# Patient Record
Sex: MALE | Race: WHITE | NOT HISPANIC OR LATINO | Employment: FULL TIME | URBAN - METROPOLITAN AREA
[De-identification: names, ages, dates, MRNs, and addresses within clinical notes are randomized per-mention and may not be internally consistent; named-entity substitution may affect disease eponyms.]

---

## 2017-04-06 ENCOUNTER — HISTORICAL (OUTPATIENT)
Dept: RADIOLOGY | Facility: HOSPITAL | Age: 57
End: 2017-04-06

## 2018-01-30 ENCOUNTER — HISTORICAL (OUTPATIENT)
Dept: ADMINISTRATIVE | Facility: HOSPITAL | Age: 58
End: 2018-01-30

## 2018-01-30 LAB
ABS NEUT (OLG): 3.31 X10(3)/MCL (ref 2.1–9.2)
ALBUMIN SERPL-MCNC: 3.9 GM/DL (ref 3.4–5)
ALBUMIN/GLOB SERPL: 1 {RATIO}
ALP SERPL-CCNC: 99 UNIT/L (ref 50–136)
ALT SERPL-CCNC: 29 UNIT/L (ref 12–78)
AST SERPL-CCNC: 16 UNIT/L (ref 15–37)
BASOPHILS # BLD AUTO: 0.1 X10(3)/MCL (ref 0–0.2)
BASOPHILS NFR BLD AUTO: 1 %
BILIRUB SERPL-MCNC: 0.9 MG/DL (ref 0.2–1)
BILIRUBIN DIRECT+TOT PNL SERPL-MCNC: 0.2 MG/DL (ref 0–0.2)
BILIRUBIN DIRECT+TOT PNL SERPL-MCNC: 0.7 MG/DL (ref 0–0.8)
BUN SERPL-MCNC: 12 MG/DL (ref 7–18)
CALCIUM SERPL-MCNC: 9 MG/DL (ref 8.5–10.1)
CHLORIDE SERPL-SCNC: 104 MMOL/L (ref 98–107)
CHOLEST SERPL-MCNC: 282 MG/DL (ref 0–200)
CHOLEST/HDLC SERPL: 5.1 {RATIO} (ref 0–5)
CO2 SERPL-SCNC: 29 MMOL/L (ref 21–32)
CREAT SERPL-MCNC: 1.01 MG/DL (ref 0.7–1.3)
EOSINOPHIL # BLD AUTO: 0.2 X10(3)/MCL (ref 0–0.9)
EOSINOPHIL NFR BLD AUTO: 3 %
ERYTHROCYTE [DISTWIDTH] IN BLOOD BY AUTOMATED COUNT: 12.2 % (ref 11.5–17)
GLOBULIN SER-MCNC: 3.9 GM/DL (ref 2.4–3.5)
GLUCOSE SERPL-MCNC: 92 MG/DL (ref 74–106)
HCT VFR BLD AUTO: 47.9 % (ref 42–52)
HDLC SERPL-MCNC: 55 MG/DL (ref 35–60)
HGB BLD-MCNC: 16.7 GM/DL (ref 14–18)
LDLC SERPL CALC-MCNC: 188 MG/DL (ref 0–129)
LYMPHOCYTES # BLD AUTO: 2.9 X10(3)/MCL (ref 0.6–4.6)
LYMPHOCYTES NFR BLD AUTO: 40 %
MCH RBC QN AUTO: 32.2 PG (ref 27–31)
MCHC RBC AUTO-ENTMCNC: 34.9 GM/DL (ref 33–36)
MCV RBC AUTO: 92.5 FL (ref 80–94)
MONOCYTES # BLD AUTO: 0.7 X10(3)/MCL (ref 0.1–1.3)
MONOCYTES NFR BLD AUTO: 10 %
NEUTROPHILS # BLD AUTO: 3.31 X10(3)/MCL (ref 1.4–7.9)
NEUTROPHILS NFR BLD AUTO: 46 %
PLATELET # BLD AUTO: 282 X10(3)/MCL (ref 130–400)
PMV BLD AUTO: 9.4 FL (ref 9.4–12.4)
POTASSIUM SERPL-SCNC: 4.4 MMOL/L (ref 3.5–5.1)
PROT SERPL-MCNC: 7.8 GM/DL (ref 6.4–8.2)
PSA SERPL-MCNC: 1.01 NG/ML (ref 0–4)
RBC # BLD AUTO: 5.18 X10(6)/MCL (ref 4.7–6.1)
SODIUM SERPL-SCNC: 138 MMOL/L (ref 136–145)
TRIGL SERPL-MCNC: 193 MG/DL (ref 30–150)
TSH SERPL-ACNC: 2.93 MIU/ML (ref 0.36–3.74)
VLDLC SERPL CALC-MCNC: 39 MG/DL
WBC # SPEC AUTO: 7.2 X10(3)/MCL (ref 4.5–11.5)

## 2021-02-08 LAB
BUN SERPL-MCNC: 12 MG/DL (ref 7–18)
CALCIUM SERPL-MCNC: 9.8 MG/DL (ref 8.5–10.1)
CHLORIDE SERPL-SCNC: 104 MMOL/L (ref 98–107)
CHOLEST SERPL-MCNC: 229 MG/DL
CO2 SERPL-SCNC: 28 MMOL/L (ref 21–32)
CREAT SERPL-MCNC: 1.08 MG/DL (ref 0.6–1.3)
GLUCOSE SERPL-MCNC: 98 MG/DL (ref 74–106)
HDLC SERPL-MCNC: 52 MG/DL (ref 35–60)
LDLC SERPL CALC-MCNC: 150 MG/DL
POTASSIUM SERPL-SCNC: 4.5 MMOL/L (ref 3.5–5.1)
SODIUM SERPL-SCNC: 141 MEQ/L (ref 131–145)
TRIGL SERPL-MCNC: 147 MG/DL (ref 30–150)

## 2021-11-09 LAB — CRC RECOMMENDATION EXT: NORMAL

## 2022-04-09 ENCOUNTER — HISTORICAL (OUTPATIENT)
Dept: ADMINISTRATIVE | Facility: HOSPITAL | Age: 62
End: 2022-04-09
Payer: COMMERCIAL

## 2022-04-27 VITALS
HEIGHT: 67 IN | OXYGEN SATURATION: 96 % | SYSTOLIC BLOOD PRESSURE: 160 MMHG | WEIGHT: 225 LBS | DIASTOLIC BLOOD PRESSURE: 94 MMHG | BODY MASS INDEX: 35.31 KG/M2

## 2022-09-16 ENCOUNTER — HISTORICAL (OUTPATIENT)
Dept: ADMINISTRATIVE | Facility: HOSPITAL | Age: 62
End: 2022-09-16
Payer: COMMERCIAL

## 2022-11-10 ENCOUNTER — DOCUMENTATION ONLY (OUTPATIENT)
Dept: INTERNAL MEDICINE | Facility: CLINIC | Age: 62
End: 2022-11-10
Payer: COMMERCIAL

## 2023-01-26 ENCOUNTER — DOCUMENTATION ONLY (OUTPATIENT)
Dept: INTERNAL MEDICINE | Facility: CLINIC | Age: 63
End: 2023-01-26
Payer: COMMERCIAL

## 2023-01-27 ENCOUNTER — DOCUMENTATION ONLY (OUTPATIENT)
Dept: INTERNAL MEDICINE | Facility: CLINIC | Age: 63
End: 2023-01-27
Payer: COMMERCIAL

## 2023-02-14 DIAGNOSIS — Z13.1 SCREENING FOR DIABETES MELLITUS (DM): ICD-10-CM

## 2023-02-14 DIAGNOSIS — Z00.00 WELLNESS EXAMINATION: Primary | ICD-10-CM

## 2023-02-14 DIAGNOSIS — E78.00 HYPERCHOLESTEREMIA: ICD-10-CM

## 2023-02-14 DIAGNOSIS — Z12.5 SCREENING PSA (PROSTATE SPECIFIC ANTIGEN): ICD-10-CM

## 2023-02-14 DIAGNOSIS — R73.01 IFG (IMPAIRED FASTING GLUCOSE): ICD-10-CM

## 2023-02-14 DIAGNOSIS — R53.83 FATIGUE, UNSPECIFIED TYPE: ICD-10-CM

## 2023-02-20 ENCOUNTER — TELEPHONE (OUTPATIENT)
Dept: INTERNAL MEDICINE | Facility: CLINIC | Age: 63
End: 2023-02-20
Payer: COMMERCIAL

## 2023-02-20 NOTE — TELEPHONE ENCOUNTER
----- Message from Vic Dobbs LPN sent at 2/14/2023 11:29 AM CST -----  Regarding: surekha paul 2/28 @10:40  Are there any outstanding tasks in patient chart? Needs fasting labs    Is there documentation of outstanding tasks in patient chart? no    Has patient been to the ER, urgent care, or another physician since last visit?    Has patient done any blood work or x-rays since last visit?

## 2023-02-23 LAB
ALBUMIN SERPL-MCNC: 4.5 G/DL (ref 3.8–4.9)
ALBUMIN/GLOB SERPL: 1.4 {RATIO} (ref 1.2–2.2)
ALP SERPL-CCNC: 121 IU/L (ref 44–121)
ALT SERPL-CCNC: 34 IU/L (ref 0–44)
AST SERPL-CCNC: 33 IU/L (ref 0–40)
BASOPHILS # BLD AUTO: 0.1 X10E3/UL (ref 0–0.2)
BASOPHILS NFR BLD AUTO: 1 %
BILIRUB SERPL-MCNC: 0.6 MG/DL (ref 0–1.2)
BUN SERPL-MCNC: 15 MG/DL (ref 8–27)
BUN/CREAT SERPL: 13 (ref 10–24)
CALCIUM SERPL-MCNC: 9.3 MG/DL (ref 8.6–10.2)
CHLORIDE SERPL-SCNC: 99 MMOL/L (ref 96–106)
CHOLEST SERPL-MCNC: 276 MG/DL (ref 100–199)
CO2 SERPL-SCNC: 22 MMOL/L (ref 20–29)
CREAT SERPL-MCNC: 1.19 MG/DL (ref 0.76–1.27)
EOSINOPHIL # BLD AUTO: 0.1 X10E3/UL (ref 0–0.4)
EOSINOPHIL NFR BLD AUTO: 2 %
ERYTHROCYTE [DISTWIDTH] IN BLOOD BY AUTOMATED COUNT: 21.2 % (ref 11.6–15.4)
EST. GFR  (NO RACE VARIABLE): 70 ML/MIN/1.73
GLOBULIN SER CALC-MCNC: 3.2 G/DL (ref 1.5–4.5)
GLUCOSE SERPL-MCNC: 105 MG/DL (ref 70–99)
HBA1C MFR BLD: 5.8 % (ref 4.8–5.6)
HCT VFR BLD AUTO: 48.5 % (ref 37.5–51)
HDLC SERPL-MCNC: 53 MG/DL
HGB BLD-MCNC: 14.9 G/DL (ref 13–17.7)
IMM GRANULOCYTES NFR BLD AUTO: 0 %
LABORATORY COMMENT REPORT: ABNORMAL
LDLC SERPL CALC-MCNC: 202 MG/DL (ref 0–99)
LYMPHOCYTES # BLD AUTO: 2.3 X10E3/UL (ref 0.7–3.1)
LYMPHOCYTES NFR BLD AUTO: 38 %
MCH RBC QN AUTO: 24.3 PG (ref 26.6–33)
MCHC RBC AUTO-ENTMCNC: 30.7 G/DL (ref 31.5–35.7)
MCV RBC AUTO: 79 FL (ref 79–97)
MONOCYTES # BLD AUTO: 0.7 X10E3/UL (ref 0.1–0.9)
MONOCYTES NFR BLD AUTO: 12 %
NEUTROPHILS # BLD AUTO: 2.8 X10E3/UL (ref 1.4–7)
NEUTROPHILS NFR BLD AUTO: 47 %
PLATELET # BLD AUTO: 309 X10E3/UL (ref 150–450)
POTASSIUM SERPL-SCNC: 4.3 MMOL/L (ref 3.5–5.2)
PROT SERPL-MCNC: 7.7 G/DL (ref 6–8.5)
PSA SERPL-MCNC: 1.4 NG/ML (ref 0–4)
RBC # BLD AUTO: 6.14 X10E6/UL (ref 4.14–5.8)
SODIUM SERPL-SCNC: 137 MMOL/L (ref 134–144)
SPECIMEN STATUS REPORT: NORMAL
TRIGL SERPL-MCNC: 117 MG/DL (ref 0–149)
TSH SERPL DL<=0.005 MIU/L-ACNC: 3.22 UIU/ML (ref 0.45–4.5)
VLDLC SERPL CALC-MCNC: 21 MG/DL (ref 5–40)
WBC # BLD AUTO: 6 X10E3/UL (ref 3.4–10.8)

## 2023-02-28 ENCOUNTER — OFFICE VISIT (OUTPATIENT)
Dept: INTERNAL MEDICINE | Facility: CLINIC | Age: 63
End: 2023-02-28
Payer: COMMERCIAL

## 2023-02-28 ENCOUNTER — DOCUMENTATION ONLY (OUTPATIENT)
Dept: INTERNAL MEDICINE | Facility: CLINIC | Age: 63
End: 2023-02-28

## 2023-02-28 VITALS
HEIGHT: 67 IN | HEART RATE: 90 BPM | DIASTOLIC BLOOD PRESSURE: 101 MMHG | OXYGEN SATURATION: 97 % | SYSTOLIC BLOOD PRESSURE: 183 MMHG | BODY MASS INDEX: 36.07 KG/M2 | WEIGHT: 229.81 LBS

## 2023-02-28 DIAGNOSIS — I10 HYPERTENSION, UNSPECIFIED TYPE: ICD-10-CM

## 2023-02-28 DIAGNOSIS — E78.5 HYPERLIPIDEMIA, UNSPECIFIED HYPERLIPIDEMIA TYPE: ICD-10-CM

## 2023-02-28 DIAGNOSIS — Z00.00 ANNUAL PHYSICAL EXAM: ICD-10-CM

## 2023-02-28 DIAGNOSIS — G72.0 STATIN MYOPATHY: ICD-10-CM

## 2023-02-28 DIAGNOSIS — E66.01 SEVERE OBESITY (BMI 35.0-39.9) WITH COMORBIDITY: Primary | ICD-10-CM

## 2023-02-28 DIAGNOSIS — T46.6X5A STATIN MYOPATHY: ICD-10-CM

## 2023-02-28 PROCEDURE — 99396 PR PREVENTIVE VISIT,EST,40-64: ICD-10-PCS | Mod: ,,, | Performed by: INTERNAL MEDICINE

## 2023-02-28 PROCEDURE — 99396 PREV VISIT EST AGE 40-64: CPT | Mod: ,,, | Performed by: INTERNAL MEDICINE

## 2023-02-28 RX ORDER — TEMAZEPAM 30 MG/1
30 CAPSULE ORAL NIGHTLY
Qty: 30 CAPSULE | Refills: 2 | Status: SHIPPED | OUTPATIENT
Start: 2023-02-28 | End: 2023-06-16

## 2023-02-28 RX ORDER — SILDENAFIL 100 MG/1
100 TABLET, FILM COATED ORAL
COMMUNITY
Start: 2022-10-27 | End: 2023-08-29

## 2023-02-28 RX ORDER — TADALAFIL 5 MG/1
5 TABLET ORAL
COMMUNITY
Start: 2023-01-26

## 2023-02-28 RX ORDER — OMEPRAZOLE 40 MG/1
40 CAPSULE, DELAYED RELEASE ORAL 2 TIMES DAILY
COMMUNITY
Start: 2023-01-11

## 2023-02-28 RX ORDER — TESTOSTERONE CYPIONATE 200 MG/ML
INJECTION, SOLUTION INTRAMUSCULAR
COMMUNITY
Start: 2022-10-26

## 2023-02-28 RX ORDER — ANASTROZOLE 1 MG/1
1 TABLET ORAL
COMMUNITY
Start: 2022-12-20

## 2023-02-28 RX ORDER — FAMOTIDINE 20 MG/1
20 TABLET, FILM COATED ORAL NIGHTLY
COMMUNITY
Start: 2023-01-11

## 2023-02-28 RX ORDER — LISINOPRIL 10 MG/1
10 TABLET ORAL DAILY
Qty: 90 TABLET | Refills: 3 | Status: SHIPPED | OUTPATIENT
Start: 2023-02-28 | End: 2024-02-28

## 2023-02-28 RX ORDER — TEMAZEPAM 30 MG/1
30 CAPSULE ORAL NIGHTLY
COMMUNITY
Start: 2023-01-27 | End: 2023-02-28 | Stop reason: SDUPTHER

## 2023-02-28 NOTE — PROGRESS NOTES
Patient ID: Antoni Wright is a 62 y.o. male.    Chief Complaint: Annual Exam (Temazepam - another dr prescribed he is needing advice on this rx or another and a refill . Short of breath since had covid hasn't come back from it )      HPI:   Patient presents here today for above reason.     Antoni is a 62-year-old gentleman presenting today for follow-up.  Blood pressures were low elevated at 180 3/100 rechecked was 160/78.  Labs show elevated cholesterol he was on medication in the past, he is allergic to statins.  He was on colestipol but has not been taking it for the last few months.  Again he has intolerance to statins.  Rest of his labs all within normal limits age-appropriate screening is up-to-date here for follow-up.      The patient's Health Maintenance was reviewed and the following appears to be due at this time:   Health Maintenance Due   Topic Date Due    Hepatitis C Screening  Never done    HIV Screening  Never done    TETANUS VACCINE  Never done    COVID-19 Vaccine (3 - Booster for Pfizer series) 10/19/2021    Influenza Vaccine (1) 09/01/2022        Past Medical History:  Past Medical History:   Diagnosis Date    Personal history of colonic polyps 11/09/2021    Dr Adonay Velez     Past Surgical History:   Procedure Laterality Date    COLONOSCOPY  11/09/2021    Dr Adonay Velez    FACIAL COSMETIC SURGERY      KNEE SURGERY      SINUS SURGERY       Review of patient's allergies indicates:  No Known Allergies  No current outpatient medications on file prior to visit.     No current facility-administered medications on file prior to visit.     Social History     Socioeconomic History    Marital status:    Tobacco Use    Smoking status: Never    Smokeless tobacco: Never   Substance and Sexual Activity    Alcohol use: Yes    Drug use: Never     History reviewed. No pertinent family history.    ROS:   Comprehensive review of systems was performed and is negative except as noted above    Vitals/PE:  "  BP (!) 183/101 (BP Location: Right arm)   Pulse 90   Ht 5' 7" (1.702 m)   Wt 104.2 kg (229 lb 12.8 oz)   SpO2 97%   BMI 35.99 kg/m²   Physical Exam    General: Alert and oriented, No acute distress.   Eye: Normal conjunctiva without exudate.  HENMT: Normocephalic/AT, Normal hearing, Oral mucosa is moist and pink   Neck: No goiter visualized.   Respiratory: Lungs CTAB, Respirations are non-labored, Breath sounds are equal, Symmetrical chest wall expansion.  Cardiovascular: Normal rate, Regular rhythm, No murmur, No edema.   Gastrointestinal: Non-distended.   Genitourinary: Deferred.  Musculoskeletal: Normal ROM, Normal gait, No deformities or amputations.  Integumentary: Warm, Dry, Intact. No diaphoresis, or flushing.  Neurologic: No focal deficits, Cranial Nerves II-XII are grossly intact.   Psychiatric: Cooperative, Appropriate mood & affect, Normal judgment, Non-suicidal.    Assessment/Plan:       1. Severe obesity (BMI 35.0-39.9) with comorbidity    2. Annual physical exam    3. Hyperlipidemia, unspecified hyperlipidemia type    4. Statin myopathy    5. Hypertension, unspecified type         Plan:  Lisinopril 10mg daily   Monitor blood pressure goal is to keep blood pressure less than 130/85.    Keep a log of blood pressure readings and call office in a week to 2 weeks to see what how he is responding to the treatment.    CT calcium score   He is intolerant to statins.  Will restart his cholesterol and do a CT calcium score.  If his numbers elevated will send to Cardiology.  Otherwise return to clinic in 6 months    Education and counseling done face to face regarding medical conditions and plan. Contact office if new symptoms develop. Should any symptoms ever significantly worsen seek emergency medical attention/go to ER. Follow up at least yearly for wellness or sooner PRN. Nurse to call patient with any results. The patient is receptive, expresses understanding and is agreeable to plan. All questions " have been answered.    No follow-ups on file.

## 2023-03-02 ENCOUNTER — TELEPHONE (OUTPATIENT)
Dept: INTERNAL MEDICINE | Facility: CLINIC | Age: 63
End: 2023-03-02
Payer: COMMERCIAL

## 2023-03-02 DIAGNOSIS — E78.5 HYPERLIPIDEMIA, UNSPECIFIED HYPERLIPIDEMIA TYPE: ICD-10-CM

## 2023-03-02 DIAGNOSIS — R93.1 ELEVATED CORONARY ARTERY CALCIUM SCORE: ICD-10-CM

## 2023-03-02 DIAGNOSIS — E66.01 SEVERE OBESITY (BMI 35.0-39.9) WITH COMORBIDITY: Primary | ICD-10-CM

## 2023-03-02 NOTE — TELEPHONE ENCOUNTER
----- Message from Nellie Love MA sent at 3/2/2023  8:24 AM CST -----    ----- Message -----  From: Dewayne Cadet II, MD  Sent: 3/1/2023   4:59 PM CST  To: [facility] Staff    CT calcium score is quite elevated at 650 increasing his heart disease risk over the next 5 years.  I would like him to see Cardiology Dr. Mcgee group for further evaluation.

## 2023-06-16 RX ORDER — TEMAZEPAM 30 MG/1
CAPSULE ORAL
Qty: 30 CAPSULE | Refills: 2 | Status: SHIPPED | OUTPATIENT
Start: 2023-06-16 | End: 2023-09-26

## 2023-08-21 DIAGNOSIS — E78.5 HYPERLIPIDEMIA, UNSPECIFIED HYPERLIPIDEMIA TYPE: ICD-10-CM

## 2023-08-21 DIAGNOSIS — T46.6X5A STATIN MYOPATHY: ICD-10-CM

## 2023-08-21 DIAGNOSIS — R73.01 IFG (IMPAIRED FASTING GLUCOSE): ICD-10-CM

## 2023-08-21 DIAGNOSIS — E66.01 SEVERE OBESITY (BMI 35.0-39.9) WITH COMORBIDITY: Primary | ICD-10-CM

## 2023-08-21 DIAGNOSIS — I10 HYPERTENSION, UNSPECIFIED TYPE: ICD-10-CM

## 2023-08-21 DIAGNOSIS — R93.1 ELEVATED CORONARY ARTERY CALCIUM SCORE: ICD-10-CM

## 2023-08-21 DIAGNOSIS — G72.0 STATIN MYOPATHY: ICD-10-CM

## 2023-08-22 ENCOUNTER — TELEPHONE (OUTPATIENT)
Dept: INTERNAL MEDICINE | Facility: CLINIC | Age: 63
End: 2023-08-22
Payer: COMMERCIAL

## 2023-08-22 NOTE — TELEPHONE ENCOUNTER
----- Message from Vic Dobbs LPN sent at 8/22/2023  7:25 AM CDT -----  Regarding: surekha kapoor 8/29 @10:20  Are there any outstanding tasks in patient chart?needs fasting labs     Is there documentation of outstanding tasks in patient chart? no    Has patient been to the ER, urgent care, or another physician since last visit?    Has patient done any blood work or x-rays since last visit?

## 2023-08-22 NOTE — TELEPHONE ENCOUNTER
----- Message from Karen Orourke sent at 8/22/2023  8:54 AM CDT -----  Regarding: returning call  .Type:  Patient Returning Call    Who Called:pt  Who Left Message for Patient:Tomasa  Does the patient know what this is regarding?:appointment  Would the patient rather a call back or a response via [facility]? Call back  Best Call Back Number:[phone]  Additional Information: pt is requesting a call back about labs

## 2023-08-23 ENCOUNTER — LAB VISIT (OUTPATIENT)
Dept: LAB | Facility: HOSPITAL | Age: 63
End: 2023-08-23
Attending: INTERNAL MEDICINE
Payer: COMMERCIAL

## 2023-08-23 ENCOUNTER — DOCUMENTATION ONLY (OUTPATIENT)
Dept: INTERNAL MEDICINE | Facility: CLINIC | Age: 63
End: 2023-08-23
Payer: COMMERCIAL

## 2023-08-23 DIAGNOSIS — R93.1 ELEVATED CORONARY ARTERY CALCIUM SCORE: ICD-10-CM

## 2023-08-23 DIAGNOSIS — R73.01 IFG (IMPAIRED FASTING GLUCOSE): ICD-10-CM

## 2023-08-23 DIAGNOSIS — I10 HYPERTENSION, UNSPECIFIED TYPE: ICD-10-CM

## 2023-08-23 DIAGNOSIS — E78.5 HYPERLIPIDEMIA, UNSPECIFIED HYPERLIPIDEMIA TYPE: ICD-10-CM

## 2023-08-23 DIAGNOSIS — G72.0 STATIN MYOPATHY: ICD-10-CM

## 2023-08-23 DIAGNOSIS — T46.6X5A STATIN MYOPATHY: ICD-10-CM

## 2023-08-23 DIAGNOSIS — E66.01 SEVERE OBESITY (BMI 35.0-39.9) WITH COMORBIDITY: ICD-10-CM

## 2023-08-23 LAB
ALBUMIN SERPL-MCNC: 4.2 G/DL (ref 3.4–4.8)
ALBUMIN/GLOB SERPL: 1.2 RATIO (ref 1.1–2)
ALP SERPL-CCNC: 91 UNIT/L (ref 40–150)
ALT SERPL-CCNC: 27 UNIT/L (ref 0–55)
AST SERPL-CCNC: 23 UNIT/L (ref 5–34)
BILIRUB SERPL-MCNC: 0.5 MG/DL
BUN SERPL-MCNC: 17.3 MG/DL (ref 8.4–25.7)
CALCIUM SERPL-MCNC: 9.7 MG/DL (ref 8.8–10)
CHLORIDE SERPL-SCNC: 105 MMOL/L (ref 98–107)
CHOLEST SERPL-MCNC: 177 MG/DL
CHOLEST/HDLC SERPL: 4 {RATIO} (ref 0–5)
CO2 SERPL-SCNC: 23 MMOL/L (ref 23–31)
CREAT SERPL-MCNC: 1.27 MG/DL (ref 0.73–1.18)
EST. AVERAGE GLUCOSE BLD GHB EST-MCNC: 99.7 MG/DL
GFR SERPLBLD CREATININE-BSD FMLA CKD-EPI: >60 MLS/MIN/1.73/M2
GLOBULIN SER-MCNC: 3.5 GM/DL (ref 2.4–3.5)
GLUCOSE SERPL-MCNC: 107 MG/DL (ref 82–115)
HBA1C MFR BLD: 5.1 %
HDLC SERPL-MCNC: 45 MG/DL (ref 35–60)
LDLC SERPL CALC-MCNC: 101 MG/DL (ref 50–140)
POTASSIUM SERPL-SCNC: 4.7 MMOL/L (ref 3.5–5.1)
PROT SERPL-MCNC: 7.7 GM/DL (ref 5.8–7.6)
SODIUM SERPL-SCNC: 139 MMOL/L (ref 136–145)
TRIGL SERPL-MCNC: 154 MG/DL (ref 34–140)
TSH SERPL-ACNC: 2.19 UIU/ML (ref 0.35–4.94)
VLDLC SERPL CALC-MCNC: 31 MG/DL

## 2023-08-23 PROCEDURE — 36415 COLL VENOUS BLD VENIPUNCTURE: CPT

## 2023-08-23 PROCEDURE — 80061 LIPID PANEL: CPT

## 2023-08-23 PROCEDURE — 83036 HEMOGLOBIN GLYCOSYLATED A1C: CPT

## 2023-08-23 PROCEDURE — 84443 ASSAY THYROID STIM HORMONE: CPT

## 2023-08-23 PROCEDURE — 80053 COMPREHEN METABOLIC PANEL: CPT

## 2023-08-29 ENCOUNTER — OFFICE VISIT (OUTPATIENT)
Dept: INTERNAL MEDICINE | Facility: CLINIC | Age: 63
End: 2023-08-29
Payer: COMMERCIAL

## 2023-08-29 VITALS
SYSTOLIC BLOOD PRESSURE: 116 MMHG | HEART RATE: 107 BPM | BODY MASS INDEX: 36.34 KG/M2 | WEIGHT: 232 LBS | DIASTOLIC BLOOD PRESSURE: 68 MMHG

## 2023-08-29 DIAGNOSIS — T46.6X5A MYALGIA DUE TO STATIN: ICD-10-CM

## 2023-08-29 DIAGNOSIS — I10 PRIMARY HYPERTENSION: Primary | ICD-10-CM

## 2023-08-29 DIAGNOSIS — E78.2 MIXED HYPERLIPIDEMIA: ICD-10-CM

## 2023-08-29 DIAGNOSIS — M79.10 MYALGIA DUE TO STATIN: ICD-10-CM

## 2023-08-29 PROCEDURE — 99214 PR OFFICE/OUTPT VISIT, EST, LEVL IV, 30-39 MIN: ICD-10-PCS | Mod: ,,, | Performed by: INTERNAL MEDICINE

## 2023-08-29 PROCEDURE — 99214 OFFICE O/P EST MOD 30 MIN: CPT | Mod: ,,, | Performed by: INTERNAL MEDICINE

## 2023-08-29 RX ORDER — EVOLOCUMAB 140 MG/ML
INJECTION, SOLUTION SUBCUTANEOUS
COMMUNITY

## 2023-08-29 RX ORDER — HYDROCHLOROTHIAZIDE 25 MG/1
TABLET ORAL
COMMUNITY

## 2023-08-29 NOTE — PROGRESS NOTES
Patient ID: Atnoni Wright is a 63 y.o. male.    Chief Complaint: Follow-up (6 month f/u, labs 8/23)      HPI:   Patient presents here today for above reason.     Antoni is a 63-year-old gentleman presenting today in follow-up.  Since last visit his blood pressures are much better controlled also put on Repatha for his cholesterol as result his LDL is now down to 101 down from 200.  His total cholesterol dropped to 100 point as well.  Rest of his labs are all within normal limits is age-appropriate screening up-to-date here for follow-up.  He has intolerance to statins was on colestipol but not enough to help with his cholesterol function.  Otherwise doing great here for follow-up.    The patient's Health Maintenance was reviewed and the following appears to be due at this time:   Health Maintenance Due   Topic Date Due    Hepatitis C Screening  Never done    HIV Screening  Never done    TETANUS VACCINE  Never done    COVID-19 Vaccine (3 - Pfizer series) 10/19/2021        Past Medical History:  Past Medical History:   Diagnosis Date    Personal history of colonic polyps 11/09/2021    Dr Adonay Velez     Past Surgical History:   Procedure Laterality Date    COLONOSCOPY  11/09/2021    Dr Adonay Velez    FACIAL COSMETIC SURGERY      KNEE SURGERY      SINUS SURGERY       Review of patient's allergies indicates:  No Known Allergies  Current Outpatient Medications on File Prior to Visit   Medication Sig Dispense Refill    evolocumab (REPATHA SURECLICK) 140 mg/mL PnIj       famotidine (PEPCID) 20 MG tablet Take 20 mg by mouth every evening.      hydroCHLOROthiazide (HYDRODIURIL) 25 MG tablet       lisinopriL 10 MG tablet Take 1 tablet (10 mg total) by mouth once daily. 90 tablet 3    omeprazole (PRILOSEC) 40 MG capsule Take 40 mg by mouth 2 (two) times daily.      tadalafiL (CIALIS) 5 MG tablet Take 5 mg by mouth.      temazepam (RESTORIL) 30 mg capsule TAKE 1 CAPSULE BY MOUTH EVERY EVENING. 30 capsule 2    anastrozole  (ARIMIDEX) 1 mg Tab Take 1 mg by mouth every 7 days.      testosterone cypionate (DEPOTESTOTERONE CYPIONATE) 200 mg/mL injection SMARTSI.75 Milliliter(s) IM Once a Week      [DISCONTINUED] sildenafiL (VIAGRA) 100 MG tablet Take 100 mg by mouth.       No current facility-administered medications on file prior to visit.     Social History     Socioeconomic History    Marital status:    Tobacco Use    Smoking status: Never    Smokeless tobacco: Never   Substance and Sexual Activity    Alcohol use: Yes    Drug use: Never     History reviewed. No pertinent family history.    ROS:   Comprehensive review of systems was performed and is negative except as noted above    Vitals/PE:   /68 (BP Location: Left arm, Patient Position: Sitting)   Pulse 107   Wt 105.2 kg (232 lb)   BMI 36.34 kg/m²   Physical Exam    General: Alert and oriented, No acute distress.   Eye: Normal conjunctiva without exudate.  HENMT: Normocephalic/AT, Normal hearing, Oral mucosa is moist and pink   Neck: No goiter visualized.   Respiratory: Lungs CTAB, Respirations are non-labored, Breath sounds are equal, Symmetrical chest wall expansion.  Cardiovascular: Normal rate, Regular rhythm, No murmur, No edema.   Gastrointestinal: Non-distended.   Genitourinary: Deferred.  Musculoskeletal: Normal ROM, Normal gait, No deformities or amputations.  Integumentary: Warm, Dry, Intact. No diaphoresis, or flushing.  Neurologic: No focal deficits, Cranial Nerves II-XII are grossly intact.   Psychiatric: Cooperative, Appropriate mood & affect, Normal judgment, Non-suicidal.    Assessment/Plan:       1. Primary hypertension   At goal cont current rx  2. Mixed hyperlipidemia   Intol to statin, was on cholestipol, now on reptha, LDL at goal, cont current rx. Sees cv  3. Myalgia due to statin   See above.           Education and counseling done face to face regarding medical conditions and plan. Contact office if new symptoms develop. Should any symptoms  ever significantly worsen seek emergency medical attention/go to ER. Follow up at least yearly for wellness or sooner PRN. Nurse to call patient with any results. The patient is receptive, expresses understanding and is agreeable to plan. All questions have been answered.    No follow-ups on file.

## 2023-09-02 ENCOUNTER — HOSPITAL ENCOUNTER (EMERGENCY)
Facility: HOSPITAL | Age: 63
Discharge: HOME | End: 2023-09-02
Attending: EMERGENCY MEDICINE
Payer: COMMERCIAL

## 2023-09-02 VITALS
HEIGHT: 67 IN | RESPIRATION RATE: 18 BRPM | SYSTOLIC BLOOD PRESSURE: 113 MMHG | OXYGEN SATURATION: 95 % | DIASTOLIC BLOOD PRESSURE: 79 MMHG | TEMPERATURE: 98 F | WEIGHT: 230 LBS | BODY MASS INDEX: 36.1 KG/M2 | HEART RATE: 80 BPM

## 2023-09-02 DIAGNOSIS — M79.642 LEFT HAND PAIN: Primary | ICD-10-CM

## 2023-09-02 DIAGNOSIS — M19.042: ICD-10-CM

## 2023-09-02 LAB
BASOPHILS # BLD AUTO: 0.04 X10(3)/MCL
BASOPHILS NFR BLD AUTO: 0.5 %
CRP SERPL-MCNC: 13.6 MG/L
EOSINOPHIL # BLD AUTO: 0.24 X10(3)/MCL (ref 0–0.9)
EOSINOPHIL NFR BLD AUTO: 3.2 %
ERYTHROCYTE [DISTWIDTH] IN BLOOD BY AUTOMATED COUNT: 12.8 % (ref 11.5–17)
HCT VFR BLD AUTO: 43 % (ref 42–52)
HGB BLD-MCNC: 15.3 G/DL (ref 14–18)
IMM GRANULOCYTES # BLD AUTO: 0.03 X10(3)/MCL (ref 0–0.04)
IMM GRANULOCYTES NFR BLD AUTO: 0.4 %
LYMPHOCYTES # BLD AUTO: 1.53 X10(3)/MCL (ref 0.6–4.6)
LYMPHOCYTES NFR BLD AUTO: 20.3 %
MCH RBC QN AUTO: 33.2 PG (ref 27–31)
MCHC RBC AUTO-ENTMCNC: 35.6 G/DL (ref 33–36)
MCV RBC AUTO: 93.3 FL (ref 80–94)
MONOCYTES # BLD AUTO: 0.82 X10(3)/MCL (ref 0.1–1.3)
MONOCYTES NFR BLD AUTO: 10.9 %
NEUTROPHILS # BLD AUTO: 4.87 X10(3)/MCL (ref 2.1–9.2)
NEUTROPHILS NFR BLD AUTO: 64.7 %
NRBC BLD AUTO-RTO: 0 %
PLATELET # BLD AUTO: 262 X10(3)/MCL (ref 130–400)
PMV BLD AUTO: 9.5 FL (ref 7.4–10.4)
RBC # BLD AUTO: 4.61 X10(6)/MCL (ref 4.7–6.1)
WBC # SPEC AUTO: 7.53 X10(3)/MCL (ref 4.5–11.5)

## 2023-09-02 PROCEDURE — 99284 EMERGENCY DEPT VISIT MOD MDM: CPT

## 2023-09-02 PROCEDURE — 86140 C-REACTIVE PROTEIN: CPT | Performed by: EMERGENCY MEDICINE

## 2023-09-02 PROCEDURE — 85025 COMPLETE CBC W/AUTO DIFF WBC: CPT | Performed by: EMERGENCY MEDICINE

## 2023-09-02 PROCEDURE — 63600175 PHARM REV CODE 636 W HCPCS: Performed by: EMERGENCY MEDICINE

## 2023-09-02 PROCEDURE — 96372 THER/PROPH/DIAG INJ SC/IM: CPT | Performed by: EMERGENCY MEDICINE

## 2023-09-02 RX ORDER — KETOROLAC TROMETHAMINE 10 MG/1
10 TABLET, FILM COATED ORAL EVERY 6 HOURS
Qty: 20 TABLET | Refills: 0 | Status: SHIPPED | OUTPATIENT
Start: 2023-09-02 | End: 2023-09-07

## 2023-09-02 RX ORDER — KETOROLAC TROMETHAMINE 30 MG/ML
60 INJECTION, SOLUTION INTRAMUSCULAR; INTRAVENOUS
Status: COMPLETED | OUTPATIENT
Start: 2023-09-02 | End: 2023-09-02

## 2023-09-02 RX ORDER — SULFAMETHOXAZOLE AND TRIMETHOPRIM 800; 160 MG/1; MG/1
2 TABLET ORAL 2 TIMES DAILY
Qty: 28 TABLET | Refills: 0 | Status: SHIPPED | OUTPATIENT
Start: 2023-09-02 | End: 2023-09-09

## 2023-09-02 RX ADMIN — KETOROLAC TROMETHAMINE 60 MG: 30 INJECTION, SOLUTION INTRAMUSCULAR at 09:09

## 2023-09-02 NOTE — ED PROVIDER NOTES
"Encounter Date: 9/2/2023       History     Chief Complaint   Patient presents with    Hand Pain     Left hand swelling and redness since last night after fishing. No wounds noted. Denies fever.      63 M  left hand pain onset last night.  States been all day fishing yesterday uses left hand quite a bit picking up 20 lb fish and using his real.  States he did not get bit or have any puncture wounds on that left hand did not cut it or sustained any injury that he knows of.  States he did have a puncture wound on the right hand but did not notice any injury to the left at all.  She has been got home left hand was swelling and red.  No fever.  Since they were in the water and fishing all day they were worried about an infection as "flesh eating bacteria".  He ulcers GI bleeding or kidney injury        Review of patient's allergies indicates:  No Known Allergies  Past Medical History:   Diagnosis Date    Personal history of colonic polyps 11/09/2021    Dr Adonay Velez     Past Surgical History:   Procedure Laterality Date    COLONOSCOPY  11/09/2021    Dr Adonay Velez    FACIAL COSMETIC SURGERY      KNEE SURGERY      SINUS SURGERY       No family history on file.  Social History     Tobacco Use    Smoking status: Never    Smokeless tobacco: Never   Substance Use Topics    Alcohol use: Yes    Drug use: Never     Review of Systems   Constitutional:  Negative for chills and fever.   Respiratory:  Negative for cough, chest tightness and shortness of breath.    Cardiovascular:  Negative for chest pain.   Gastrointestinal:  Negative for abdominal pain, nausea and vomiting.   Musculoskeletal:  Positive for joint swelling. Negative for myalgias and neck pain.   Neurological:  Negative for syncope.   All other systems reviewed and are negative.      Physical Exam     Initial Vitals [09/02/23 0817]   BP Pulse Resp Temp SpO2   137/80 86 18 97.9 °F (36.6 °C) 97 %      MAP       --         Physical Exam    Nursing note and vitals " reviewed.  Constitutional: He appears well-developed and well-nourished. No distress.   HENT:   Head: Normocephalic and atraumatic.   Eyes: Conjunctivae are normal.   Cardiovascular:  Normal rate.           Pulmonary/Chest: No respiratory distress. He has no wheezes. He has no rhonchi.   Abdominal: Abdomen is soft. There is no abdominal tenderness. There is no rebound and no guarding.   Musculoskeletal:         General: Normal range of motion.      Comments: Some erythema and swelling of the left hand at the MCP joints and slightly at the PIP joints.  There is minimal erythema at those areas and on the dorsum of the hand.  2+ radial pulse I carefully inspected the whole hand and fingers I do not see any puncture wounds cuts scrapes or other sources of infection.  Patient in his wife did not identify any on that hand either.     Neurological: He is alert and oriented to person, place, and time. He has normal strength.   Skin: Skin is warm and dry.   Psychiatric: He has a normal mood and affect.         ED Course   Procedures  Labs Reviewed   CBC WITH DIFFERENTIAL - Abnormal; Notable for the following components:       Result Value    RBC 4.61 (*)     MCH 33.2 (*)     All other components within normal limits   CBC W/ AUTO DIFFERENTIAL    Narrative:     The following orders were created for panel order CBC auto differential.  Procedure                               Abnormality         Status                     ---------                               -----------         ------                     CBC with Differential[[id number]]        Abnormal            Final result                 Please view results for these tests on the individual orders.   C-REACTIVE PROTEIN          Imaging Results              X-Ray Hand 3 view Left (Final result)  Result time 09/02/23 09:07:55      Final result by Bruna Khan MD (09/02/23 09:07:55)                   Impression:      No acute bony abnormality.      Electronically  signed by: Bruna Khan  Date:    09/02/2023  Time:    09:07               Narrative:    EXAMINATION:  XR HAND COMPLETE 3 VIEW LEFT    CLINICAL HISTORY:  hand hapin;    COMPARISON:  None.    FINDINGS:  There is no acute fracture or malalignment.  There is no radiopaque foreign body.                                       Medications   ketorolac injection 60 mg (60 mg Intramuscular Given 9/2/23 0910)     Medical Decision Making  Without a source of infection I do not believe a bacterial infection is as likely but it is considered.  There is some heat there is some redness.  I explained to them that while yes infection can cause this it would be extremely unlikely from developing infection without a puncture of the skin.  Due to the fact that he was fishing he could have sustained a small injury not realized it.  I do not appreciate any fluctuance or unilateral joint involvement.  He is able to fully make a hand  but does have some discomfort with it.  They are not appreciate any sort of abscess or any sort of flexor tenosynovitis on exam.  I explained the more likely cause here is from the overuse yesterday that he has inflammation.  Discussed anti-inflammatories as the treatment for that.  We have marked the areas of erythema with a skin marker explained to them that this may continue to get slightly worse over the next 12 hours or so but if by late tonight or tomorrow morning it is worsened or if it significantly worsens over the next several hours develops fever over 100.4 and he is returned may require IV antibiotics at that time    Problems Addressed:  Inflammation of hand joint, left: acute illness or injury  Left hand pain: acute illness or injury    Amount and/or Complexity of Data Reviewed  Labs: ordered.  Radiology: ordered.    Risk  Prescription drug management.  Parenteral controlled substances.               ED Course as of 09/02/23 0937   Sat Sep 02, 2023   0923 Has no fractures or obvoius joint  disruption/arthritic changes in the hand x-ray by my interpretation [LF]      ED Course User Index  [LF] Jarred Mirza MD                    Clinical Impression:   Final diagnoses:  [M79.642] Left hand pain (Primary)  [M19.042] Inflammation of hand joint, left        ED Disposition Condition    Discharge Stable          ED Prescriptions       Medication Sig Dispense Start Date End Date Auth. Provider    ketorolac (TORADOL) 10 mg tablet Take 1 tablet (10 mg total) by mouth every 6 (six) hours. for 5 days 20 tablet 9/2/2023 9/7/2023 Jarred Mirza MD    sulfamethoxazole-trimethoprim 800-160mg (BACTRIM DS) 800-160 mg Tab Take 2 tablets by mouth 2 (two) times daily. for 7 days 28 tablet 9/2/2023 9/9/2023 Jarred Mirza MD          Follow-up Information       Follow up With Specialties Details Why Contact Info    Dewayne Cadet II, MD Internal Medicine Schedule an appointment as soon as possible for a visit   [address] [place] [id number]  [phone]               Jarerd Mirza MD  09/02/23 0951

## 2023-09-02 NOTE — DISCHARGE INSTRUCTIONS
If you redness extends beyond the marked line, or you develop fever over 100.4 or swelling worsens within the next 6-12 hours then fill the prescription of antibiotics and start taking it.  If symptoms continue to improve without antibiotics then continue the anti-inflammatories and follow up with her primary care provider this week.    If symptoms worsen despite antibiotic use return to emergency room

## 2024-01-16 RX ORDER — TEMAZEPAM 30 MG/1
30 CAPSULE ORAL NIGHTLY
Qty: 30 CAPSULE | Refills: 2 | Status: SHIPPED | OUTPATIENT
Start: 2024-01-16 | End: 2024-05-28

## 2024-05-28 RX ORDER — TEMAZEPAM 30 MG/1
30 CAPSULE ORAL
Qty: 30 CAPSULE | Refills: 2 | Status: SHIPPED | OUTPATIENT
Start: 2024-05-28

## 2024-08-27 ENCOUNTER — DOCUMENTATION ONLY (OUTPATIENT)
Dept: INTERNAL MEDICINE | Facility: CLINIC | Age: 64
End: 2024-08-27
Payer: COMMERCIAL

## 2024-09-04 RX ORDER — TEMAZEPAM 30 MG/1
30 CAPSULE ORAL NIGHTLY
Qty: 30 CAPSULE | Refills: 2 | Status: SHIPPED | OUTPATIENT
Start: 2024-09-04

## 2024-09-08 ENCOUNTER — OFFICE VISIT (OUTPATIENT)
Dept: URGENT CARE | Facility: CLINIC | Age: 64
End: 2024-09-08
Payer: COMMERCIAL

## 2024-09-08 VITALS
BODY MASS INDEX: 35.75 KG/M2 | RESPIRATION RATE: 20 BRPM | HEIGHT: 67 IN | TEMPERATURE: 99 F | SYSTOLIC BLOOD PRESSURE: 127 MMHG | HEART RATE: 99 BPM | DIASTOLIC BLOOD PRESSURE: 83 MMHG | OXYGEN SATURATION: 94 % | WEIGHT: 227.81 LBS

## 2024-09-08 DIAGNOSIS — M70.21 OLECRANON BURSITIS OF RIGHT ELBOW: Primary | ICD-10-CM

## 2024-09-08 PROCEDURE — 99213 OFFICE O/P EST LOW 20 MIN: CPT | Mod: 25,,, | Performed by: FAMILY MEDICINE

## 2024-09-08 PROCEDURE — 96372 THER/PROPH/DIAG INJ SC/IM: CPT | Mod: ,,, | Performed by: FAMILY MEDICINE

## 2024-09-08 RX ORDER — KETOROLAC TROMETHAMINE 30 MG/ML
30 INJECTION, SOLUTION INTRAMUSCULAR; INTRAVENOUS ONCE
Status: COMPLETED | OUTPATIENT
Start: 2024-09-08 | End: 2024-09-08

## 2024-09-08 RX ORDER — VALSARTAN 160 MG/1
160 TABLET ORAL
COMMUNITY
Start: 2024-08-08

## 2024-09-08 RX ORDER — SILDENAFIL 100 MG/1
100 TABLET, FILM COATED ORAL
COMMUNITY
Start: 2024-07-19

## 2024-09-08 RX ORDER — DICLOFENAC SODIUM 50 MG/1
50 TABLET, DELAYED RELEASE ORAL 2 TIMES DAILY
Qty: 28 TABLET | Refills: 0 | Status: SHIPPED | OUTPATIENT
Start: 2024-09-08 | End: 2024-09-22

## 2024-09-08 RX ADMIN — KETOROLAC TROMETHAMINE 30 MG: 30 INJECTION, SOLUTION INTRAMUSCULAR; INTRAVENOUS at 10:09

## 2024-09-08 NOTE — PATIENT INSTRUCTIONS
Medication sent to pharmacy take as prescribed  Recommend to purchase a protective elbow sling or covering.  Avoid any contact of the elbow with hard surfaces or repetitive motions.  Do not apply any pressure to the elbow.  Apply ice to the elbow for 10-15 minutes, 4-5 times per day.  Put a thin cloth between the ice and your skin  If little or no improvement within 2 weeks, you may benefit from physical therapy  Call or seek immediate medical attention if you develop a fever, redness to the elbow, drainage, decreased elbow range of motion, or for any new or worrisome symptoms that arise at home or if you do not get better as expected.

## 2024-09-08 NOTE — PROGRESS NOTES
"Subjective:      Patient ID: Antoni Wright is a 64 y.o. male.    Vitals:  height is 5' 7" (1.702 m) and weight is 103.3 kg (227 lb 12.8 oz). His oral temperature is 98.8 °F (37.1 °C). His blood pressure is 127/83 and his pulse is 99. His respiration is 20 and oxygen saturation is 94% (abnormal).     Chief Complaint: Joint Swelling     Patient is a 64 y.o. male who presents to urgent care with complaints of right elbow swelling and pain x1 weeks. Alleviating factors include ibuprofen with mild amount of relief. Patient denies any known injury, fever, radiating pain.         Constitution: Negative for chills, sweating, fatigue and fever.   HENT: Negative.     Neck: neck negative.   Cardiovascular: Negative.    Eyes: Negative.    Respiratory: Negative.     Gastrointestinal: Negative.    Endocrine: negative.   Genitourinary: Negative.    Musculoskeletal:  Positive for pain, joint pain and joint swelling. Negative for trauma and abnormal ROM of joint.   Skin:  Negative for rash, erythema and bruising.   Allergic/Immunologic: Negative.    Neurological: Negative.  Negative for disorientation and altered mental status.   Hematologic/Lymphatic: Negative.    Psychiatric/Behavioral:  Negative for altered mental status, disorientation and confusion.       Objective:     Physical Exam   Constitutional: He is oriented to person, place, and time. He appears well-developed.   HENT:   Head: Normocephalic and atraumatic. Head is without abrasion, without contusion and without laceration.   Ears:   Right Ear: External ear normal.   Left Ear: External ear normal.   Nose: Nose normal.   Mouth/Throat: Oropharynx is clear and moist and mucous membranes are normal.   Eyes: Conjunctivae, EOM and lids are normal. Pupils are equal, round, and reactive to light.   Neck: Trachea normal and phonation normal. Neck supple.   Cardiovascular: Normal rate, regular rhythm and normal heart sounds.   Pulmonary/Chest: Effort normal and breath sounds " normal. No stridor. No respiratory distress.   Musculoskeletal: Normal range of motion.         General: Normal range of motion.      Right elbow: He exhibits swelling and effusion. He exhibits normal range of motion. No tenderness found.   Neurological: He is alert and oriented to person, place, and time.   Skin: Skin is warm, dry, intact and no rash. Capillary refill takes less than 2 seconds. No abrasion, No burn, No bruising, No erythema and No ecchymosis   Psychiatric: His speech is normal and behavior is normal. Judgment and thought content normal.   Nursing note and vitals reviewed.         Previous History      Review of patient's allergies indicates:  No Known Allergies    Past Medical History:   Diagnosis Date    Hypertension     Personal history of colonic polyps 2021    Dr Adonay Velez     Current Outpatient Medications   Medication Instructions    anastrozole (ARIMIDEX) 1 mg, Oral, Every 7 days    diclofenac (VOLTAREN) 50 mg, Oral, 2 times daily    evolocumab (REPATHA SURECLICK) 140 mg/mL PnIj No dose, route, or frequency recorded.    famotidine (PEPCID) 20 mg, Oral, Nightly    hydroCHLOROthiazide (HYDRODIURIL) 25 MG tablet No dose, route, or frequency recorded.    lisinopriL 10 mg, Oral, Daily    omeprazole (PRILOSEC) 40 mg, Oral, 2 times daily    sildenafiL (VIAGRA) 100 mg, Oral    tadalafiL (CIALIS) 5 mg, Oral    temazepam (RESTORIL) 30 mg, Oral, Nightly    testosterone cypionate (DEPOTESTOTERONE CYPIONATE) 200 mg/mL injection SMARTSI.75 Milliliter(s) IM Once a Week    valsartan (DIOVAN) 160 mg, Oral     Past Surgical History:   Procedure Laterality Date    COLONOSCOPY  2021    Dr Adonay Velez    FACIAL COSMETIC SURGERY      KNEE SURGERY      SINUS SURGERY       Family History   Family history unknown: Yes       Social History     Tobacco Use    Smoking status: Never    Smokeless tobacco: Never   Substance Use Topics    Alcohol use: Yes    Drug use: Never        Physical Exam   "    Vital Signs Reviewed   /83   Pulse 99   Temp 98.8 °F (37.1 °C) (Oral)   Resp 20   Ht 5' 7" (1.702 m)   Wt 103.3 kg (227 lb 12.8 oz)   SpO2 (!) 94%   BMI 35.68 kg/m²        Procedures    Procedures     Labs     Results for orders placed or performed during the hospital encounter of 09/02/23   C-reactive protein   Result Value Ref Range    CRP 13.60 (H) <5.00 mg/L   CBC with Differential   Result Value Ref Range    WBC 7.53 4.50 - 11.50 x10(3)/mcL    RBC 4.61 (L) 4.70 - 6.10 x10(6)/mcL    Hgb 15.3 14.0 - 18.0 g/dL    Hct 43.0 42.0 - 52.0 %    MCV 93.3 80.0 - 94.0 fL    MCH 33.2 (H) 27.0 - 31.0 pg    MCHC 35.6 33.0 - 36.0 g/dL    RDW 12.8 11.5 - 17.0 %    Platelet 262 130 - 400 x10(3)/mcL    MPV 9.5 7.4 - 10.4 fL    Neut % 64.7 %    Lymph % 20.3 %    Mono % 10.9 %    Eos % 3.2 %    Basophil % 0.5 %    Lymph # 1.53 0.6 - 4.6 x10(3)/mcL    Neut # 4.87 2.1 - 9.2 x10(3)/mcL    Mono # 0.82 0.1 - 1.3 x10(3)/mcL    Eos # 0.24 0 - 0.9 x10(3)/mcL    Baso # 0.04 <=0.2 x10(3)/mcL    IG# 0.03 0 - 0.04 x10(3)/mcL    IG% 0.4 %    NRBC% 0.0 %      Assessment:     1. Olecranon bursitis of right elbow        Plan:   Right elbow presents with  olecranon bursitis.  There is no warmth redness or tenderness.  Patient is afebrile.  He reports he sits at a desk, is right-handed puts pressure on his right elbow daily.  I have a low suspicion that bursitis is due to infection at this time.  We will treat with elbow protection, Rest, and anti-inflammatories.  He verbalizes understanding and agrees with plan of care      Medication sent to pharmacy take as prescribed  Recommend to purchase a protective elbow sling or covering.  Avoid any contact of the elbow with hard surfaces or repetitive motions.  Do not apply any pressure to the elbow.  Apply ice to the elbow for 10-15 minutes, 4-5 times per day.  Put a thin cloth between the ice and your skin  If little or no improvement within 2 weeks, you may benefit from physical " therapy  Call or seek immediate medical attention if you develop a fever, redness to the elbow, drainage, decreased elbow range of motion, or for any new or worrisome symptoms that arise at home or if you do not get better as expected.      Olecranon bursitis of right elbow    Other orders  -     ketorolac injection 30 mg  -     diclofenac (VOLTAREN) 50 MG EC tablet; Take 1 tablet (50 mg total) by mouth 2 (two) times daily. for 14 days  Dispense: 28 tablet; Refill: 0

## 2024-10-03 ENCOUNTER — TELEPHONE (OUTPATIENT)
Dept: INTERNAL MEDICINE | Facility: CLINIC | Age: 64
End: 2024-10-03
Payer: COMMERCIAL

## 2024-10-03 DIAGNOSIS — R53.83 FATIGUE, UNSPECIFIED TYPE: Primary | ICD-10-CM

## 2024-10-03 NOTE — TELEPHONE ENCOUNTER
----- Message from Nurse Ho sent at 10/3/2024  7:37 AM CDT -----  Regarding: surekha kapoor 10/11 @9:40  Are there any outstanding tasks in patient chart? Needs fasting labs    Is there documentation of outstanding tasks in patient chart? no    Has patient been to the ER, urgent care, or another physician since last visit?    Has patient done any blood work or x-rays since last visit?    5. PLEASE HAVE PATIENT BRING MEDICATION LIST OR BOTTLES TO EVERY OFFICE VISIT

## 2024-10-07 ENCOUNTER — LAB VISIT (OUTPATIENT)
Dept: LAB | Facility: HOSPITAL | Age: 64
End: 2024-10-07
Attending: INTERNAL MEDICINE
Payer: COMMERCIAL

## 2024-10-07 DIAGNOSIS — I10 PRIMARY HYPERTENSION: ICD-10-CM

## 2024-10-07 DIAGNOSIS — M79.10 MYALGIA DUE TO STATIN: ICD-10-CM

## 2024-10-07 DIAGNOSIS — R53.83 FATIGUE, UNSPECIFIED TYPE: ICD-10-CM

## 2024-10-07 DIAGNOSIS — E78.2 MIXED HYPERLIPIDEMIA: ICD-10-CM

## 2024-10-07 DIAGNOSIS — T46.6X5A MYALGIA DUE TO STATIN: ICD-10-CM

## 2024-10-07 LAB
ALBUMIN SERPL-MCNC: 4 G/DL (ref 3.4–4.8)
ALBUMIN/GLOB SERPL: 1.1 RATIO (ref 1.1–2)
ALP SERPL-CCNC: 119 UNIT/L (ref 40–150)
ALT SERPL-CCNC: 33 UNIT/L (ref 0–55)
ANION GAP SERPL CALC-SCNC: 11 MEQ/L
AST SERPL-CCNC: 31 UNIT/L (ref 5–34)
BASOPHILS # BLD AUTO: 0.04 X10(3)/MCL
BASOPHILS NFR BLD AUTO: 0.5 %
BILIRUB SERPL-MCNC: 0.7 MG/DL
BUN SERPL-MCNC: 27.3 MG/DL (ref 8.4–25.7)
CALCIUM SERPL-MCNC: 9.2 MG/DL (ref 8.8–10)
CHLORIDE SERPL-SCNC: 102 MMOL/L (ref 98–107)
CHOLEST SERPL-MCNC: 137 MG/DL
CHOLEST/HDLC SERPL: 4 {RATIO} (ref 0–5)
CO2 SERPL-SCNC: 23 MMOL/L (ref 23–31)
CREAT SERPL-MCNC: 1.72 MG/DL (ref 0.73–1.18)
CREAT/UREA NIT SERPL: 16
EOSINOPHIL # BLD AUTO: 0.26 X10(3)/MCL (ref 0–0.9)
EOSINOPHIL NFR BLD AUTO: 3.5 %
ERYTHROCYTE [DISTWIDTH] IN BLOOD BY AUTOMATED COUNT: 17.3 % (ref 11.5–17)
GFR SERPLBLD CREATININE-BSD FMLA CKD-EPI: 44 ML/MIN/1.73/M2
GLOBULIN SER-MCNC: 3.7 GM/DL (ref 2.4–3.5)
GLUCOSE SERPL-MCNC: 102 MG/DL (ref 82–115)
HCT VFR BLD AUTO: 50.4 % (ref 42–52)
HDLC SERPL-MCNC: 39 MG/DL (ref 35–60)
HGB BLD-MCNC: 16.2 G/DL (ref 14–18)
IMM GRANULOCYTES # BLD AUTO: 0.01 X10(3)/MCL (ref 0–0.04)
IMM GRANULOCYTES NFR BLD AUTO: 0.1 %
LDLC SERPL CALC-MCNC: 63 MG/DL (ref 50–140)
LYMPHOCYTES # BLD AUTO: 1.79 X10(3)/MCL (ref 0.6–4.6)
LYMPHOCYTES NFR BLD AUTO: 24.4 %
MCH RBC QN AUTO: 26.3 PG (ref 27–31)
MCHC RBC AUTO-ENTMCNC: 32.1 G/DL (ref 33–36)
MCV RBC AUTO: 82 FL (ref 80–94)
MONOCYTES # BLD AUTO: 0.95 X10(3)/MCL (ref 0.1–1.3)
MONOCYTES NFR BLD AUTO: 13 %
NEUTROPHILS # BLD AUTO: 4.28 X10(3)/MCL (ref 2.1–9.2)
NEUTROPHILS NFR BLD AUTO: 58.5 %
NRBC BLD AUTO-RTO: 0 %
PLATELET # BLD AUTO: 283 X10(3)/MCL (ref 130–400)
PMV BLD AUTO: 9.9 FL (ref 7.4–10.4)
POTASSIUM SERPL-SCNC: 4.7 MMOL/L (ref 3.5–5.1)
PROT SERPL-MCNC: 7.7 GM/DL (ref 5.8–7.6)
RBC # BLD AUTO: 6.15 X10(6)/MCL (ref 4.7–6.1)
SODIUM SERPL-SCNC: 136 MMOL/L (ref 136–145)
TESTOST SERPL-MCNC: 1255.01 NG/DL (ref 220.91–715.81)
TRIGL SERPL-MCNC: 176 MG/DL (ref 34–140)
TSH SERPL-ACNC: 2.92 UIU/ML (ref 0.35–4.94)
VLDLC SERPL CALC-MCNC: 35 MG/DL
WBC # BLD AUTO: 7.33 X10(3)/MCL (ref 4.5–11.5)

## 2024-10-07 PROCEDURE — 84403 ASSAY OF TOTAL TESTOSTERONE: CPT

## 2024-10-07 PROCEDURE — 80053 COMPREHEN METABOLIC PANEL: CPT

## 2024-10-07 PROCEDURE — 85025 COMPLETE CBC W/AUTO DIFF WBC: CPT

## 2024-10-07 PROCEDURE — 84443 ASSAY THYROID STIM HORMONE: CPT

## 2024-10-07 PROCEDURE — 36415 COLL VENOUS BLD VENIPUNCTURE: CPT

## 2024-10-07 PROCEDURE — 80061 LIPID PANEL: CPT

## 2024-10-11 ENCOUNTER — OFFICE VISIT (OUTPATIENT)
Dept: INTERNAL MEDICINE | Facility: CLINIC | Age: 64
End: 2024-10-11
Payer: COMMERCIAL

## 2024-10-11 VITALS
SYSTOLIC BLOOD PRESSURE: 128 MMHG | WEIGHT: 222 LBS | HEART RATE: 87 BPM | HEIGHT: 67 IN | DIASTOLIC BLOOD PRESSURE: 78 MMHG | BODY MASS INDEX: 34.84 KG/M2

## 2024-10-11 DIAGNOSIS — I10 PRIMARY HYPERTENSION: Chronic | ICD-10-CM

## 2024-10-11 DIAGNOSIS — J32.9 SINUSITIS, UNSPECIFIED CHRONICITY, UNSPECIFIED LOCATION: ICD-10-CM

## 2024-10-11 DIAGNOSIS — E78.2 MIXED HYPERLIPIDEMIA: Chronic | ICD-10-CM

## 2024-10-11 DIAGNOSIS — E66.09 CLASS 1 OBESITY DUE TO EXCESS CALORIES WITH SERIOUS COMORBIDITY AND BODY MASS INDEX (BMI) OF 34.0 TO 34.9 IN ADULT: Chronic | ICD-10-CM

## 2024-10-11 DIAGNOSIS — E66.811 CLASS 1 OBESITY DUE TO EXCESS CALORIES WITH SERIOUS COMORBIDITY AND BODY MASS INDEX (BMI) OF 34.0 TO 34.9 IN ADULT: Chronic | ICD-10-CM

## 2024-10-11 DIAGNOSIS — Z12.11 SCREENING FOR COLORECTAL CANCER: ICD-10-CM

## 2024-10-11 DIAGNOSIS — G72.0 STATIN MYOPATHY: Chronic | ICD-10-CM

## 2024-10-11 DIAGNOSIS — Z00.00 WELLNESS EXAMINATION: Primary | ICD-10-CM

## 2024-10-11 DIAGNOSIS — Z23 NEED FOR INFLUENZA VACCINATION: ICD-10-CM

## 2024-10-11 DIAGNOSIS — K21.9 GASTROESOPHAGEAL REFLUX DISEASE, UNSPECIFIED WHETHER ESOPHAGITIS PRESENT: Chronic | ICD-10-CM

## 2024-10-11 DIAGNOSIS — T46.6X5A STATIN MYOPATHY: Chronic | ICD-10-CM

## 2024-10-11 DIAGNOSIS — H25.13 AGE-RELATED NUCLEAR CATARACT, BILATERAL: Chronic | ICD-10-CM

## 2024-10-11 DIAGNOSIS — Z12.5 SCREENING FOR MALIGNANT NEOPLASM OF PROSTATE: ICD-10-CM

## 2024-10-11 DIAGNOSIS — F51.01 PRIMARY INSOMNIA: Chronic | ICD-10-CM

## 2024-10-11 DIAGNOSIS — E29.1 TESTICULAR HYPOFUNCTION: Chronic | ICD-10-CM

## 2024-10-11 DIAGNOSIS — Z12.12 SCREENING FOR COLORECTAL CANCER: ICD-10-CM

## 2024-10-11 DIAGNOSIS — R94.4 RENAL FUNCTION TEST ABNORMAL: ICD-10-CM

## 2024-10-11 DIAGNOSIS — N52.9 ERECTILE DYSFUNCTION, UNSPECIFIED ERECTILE DYSFUNCTION TYPE: Chronic | ICD-10-CM

## 2024-10-11 PROBLEM — M79.10 MYALGIA DUE TO STATIN: Status: ACTIVE | Noted: 2024-10-11

## 2024-10-11 PROBLEM — M79.10 MYALGIA DUE TO STATIN: Chronic | Status: ACTIVE | Noted: 2024-10-11

## 2024-10-11 RX ORDER — AZITHROMYCIN 250 MG/1
TABLET, FILM COATED ORAL
Qty: 6 TABLET | Refills: 0 | Status: SHIPPED | OUTPATIENT
Start: 2024-10-11

## 2024-10-11 RX ORDER — TIZANIDINE 4 MG/1
4 TABLET ORAL EVERY 8 HOURS PRN
COMMUNITY
Start: 2024-09-30

## 2024-10-11 RX ORDER — MELOXICAM 15 MG/1
15 TABLET ORAL DAILY
COMMUNITY
Start: 2024-09-30

## 2024-10-11 NOTE — ASSESSMENT & PLAN NOTE
-stable   -currently on omeprazole and famotidine, continue  -avoid food triggers (acidic, spicy, greasy, etc.)  -last meal of the day 2-3 hours before bedtime  -avoid large meals in one sitting, encourage small frequent meals

## 2024-10-11 NOTE — ASSESSMENT & PLAN NOTE
eGFR   Date Value Ref Range Status   10/07/2024 44 mL/min/1.73/m2 Final   02/23/2023 70 >59 mL/min/1.73 Final     Lab Results   Component Value Date    CREATININE 1.72 (H) 10/07/2024    BUN 27.3 (H) 10/07/2024     10/07/2024    K 4.7 10/07/2024     10/07/2024    CO2 23 10/07/2024   -order repeat metabolic panel encouraged to obtain well hydrated    -increase fluid hydration   -avoid nephrotoxic drugs (NSAIDs, etc.)   -encouraged tight control of hypertension

## 2024-10-11 NOTE — ASSESSMENT & PLAN NOTE
- onset > 2 weeks   -initiate azithromycin pack   -currently utilizing OTC pseudoephedrine, okay to utilize however warned against elevations in blood pressure   -okay to utilize OTC antihistamine  -notify clinic for new or worsening symptoms

## 2024-10-11 NOTE — ASSESSMENT & PLAN NOTE
-patient feeling generally well today  -wellness labs reviewed in generally stable, elevated testosterone and renal function  -age-appropriate screenings reviewed and discussed  -immunizations reviewed and discussed  -encourage routine aerobic exercise 2 to 3 times a week  -increase fluid hydration

## 2024-10-11 NOTE — PROGRESS NOTES
Patient ID: Antoni Wright is a 64 y.o. male.    Chief Complaint: Annual Exam (Annual wellness exam- congestion and chest congestion for about 2 weeks. No other fever, chills, body aches. )    Antoni Wright is a 64 y.o. male, known to Dr Cadet, presents today for a wellness visit.  Medical comorbidities include HTN, HLD, statin myopathy, obesity, insomnia, testicular hypofunction followed by Urology.  Since last visit patient reports has been fairly well without major injury.  Does have some ongoing sinus congestion, postnasal drip, and intermittent cough.  Onset of symptoms greater than 2 weeks.  Currently attempted to treat with OTC antihistamines/decongestants with mild improvement only.  Denies fevers, chills, shortness a breath, or wheezing.  Most recent wellness labs reviewed in generally stable.  However, noted elevated renal function with patient stating has poor fluid intake.  Also currently on testosterone replacement per Urology with elevated testosterone level.  Patient/family member reports obtain labs after recent testosterone administration.  Ultimately discussed repeating labs for further evaluation.  Age-appropriate immunizations discussed, open to influenza vaccine today.  Age-appropriate screenings discussed, patient plans to obtain viviana rectal cancer screenings in the near future.  Otherwise stable for today's visit    Wellness:  10/11/2024        MEDICAL HISTORY:    Past Medical History:   Diagnosis Date    Age-related nuclear cataract, bilateral 10/11/2024    GERD (gastroesophageal reflux disease) 10/11/2024    Hypertension     Mixed hyperlipidemia 10/11/2024    Myalgia due to statin 10/11/2024    Personal history of colonic polyps 11/09/2021    Dr Adonay Velez    Primary hypertension 10/11/2024    Primary insomnia 10/11/2024      Past Surgical History:   Procedure Laterality Date    COLONOSCOPY  11/09/2021    Dr Adonay Velez    FACIAL COSMETIC SURGERY      KNEE SURGERY       "SINUS SURGERY        Social History     Tobacco Use    Smoking status: Never    Smokeless tobacco: Never   Substance Use Topics    Alcohol use: Yes    Drug use: Never          Health Maintenance Due   Topic Date Due    Hepatitis C Screening  Never done    HIV Screening  Never done    TETANUS VACCINE  Never done    RSV Vaccine (Age 60+ and Pregnant patients) (1 - Risk 60-74 years 1-dose series) Never done    PROSTATE-SPECIFIC ANTIGEN  02/23/2024    Hemoglobin A1c (Prediabetes)  08/23/2024    COVID-19 Vaccine (3 - 2024-25 season) 09/01/2024    Colorectal Cancer Screening  11/09/2024          Patient Care Team:  Dewayne Cadet II, MD as PCP - General (Internal Medicine)  Osito Messer MD as Consulting Physician (Urology)  Abdulaziz Vegas MD as Consulting Physician (Ophthalmology)      Review of Systems   Constitutional:  Negative for fatigue and fever.   HENT:  Positive for congestion and postnasal drip. Negative for rhinorrhea, sore throat and trouble swallowing.    Eyes:  Negative for redness and visual disturbance.   Respiratory:  Positive for cough. Negative for chest tightness and shortness of breath.    Cardiovascular:  Negative for chest pain and palpitations.   Gastrointestinal:  Negative for abdominal pain, constipation, diarrhea, nausea and vomiting.   Genitourinary:  Negative for dysuria, flank pain, frequency and urgency.   Musculoskeletal:  Negative for arthralgias, gait problem and myalgias.   Skin:  Negative for rash and wound.   Neurological:  Negative for facial asymmetry, speech difficulty, weakness and headaches.   All other systems reviewed and are negative.      Objective:   /78   Pulse 87   Ht 5' 7" (1.702 m)   Wt 100.7 kg (222 lb)   BMI 34.77 kg/m²      Physical Exam  Constitutional:       General: He is not in acute distress.     Appearance: Normal appearance. He is obese.   HENT:      Right Ear: Tympanic membrane, ear canal and external ear normal.      Left Ear: " Tympanic membrane, ear canal and external ear normal.      Nose: Nose normal.      Mouth/Throat:      Mouth: Mucous membranes are moist.      Pharynx: Oropharynx is clear.   Eyes:      Extraocular Movements: Extraocular movements intact.      Conjunctiva/sclera: Conjunctivae normal.      Pupils: Pupils are equal, round, and reactive to light.   Cardiovascular:      Rate and Rhythm: Normal rate and regular rhythm.      Pulses: Normal pulses.      Heart sounds: Normal heart sounds. No murmur heard.     No gallop.   Pulmonary:      Effort: Pulmonary effort is normal.      Breath sounds: Normal breath sounds. No wheezing.   Abdominal:      General: Bowel sounds are normal. There is no distension.      Palpations: Abdomen is soft. There is no mass.      Tenderness: There is no abdominal tenderness. There is no guarding.   Musculoskeletal:         General: Normal range of motion.   Skin:     General: Skin is warm and dry.   Neurological:      Mental Status: He is alert. Mental status is at baseline.      Sensory: No sensory deficit.      Motor: No weakness.           Assessment:       ICD-10-CM ICD-9-CM   1. Wellness examination  Z00.00 V70.0   2. Primary hypertension  I10 401.9   3. Mixed hyperlipidemia  E78.2 272.2   4. Statin myopathy  G72.0 359.4    T46.6X5A E942.2   5. Gastroesophageal reflux disease, unspecified whether esophagitis present  K21.9 530.81   6. Erectile dysfunction, unspecified erectile dysfunction type  N52.9 607.84   7. Testicular hypofunction  E29.1 257.2   8. Primary insomnia  F51.01 307.42   9. Class 1 obesity due to excess calories with serious comorbidity and body mass index (BMI) of 34.0 to 34.9 in adult  E66.811 278.00    E66.09 V85.34    Z68.34    10. Age-related nuclear cataract, bilateral  H25.13 366.16   11. Renal function test abnormal  R94.4 794.4   12. Sinusitis, unspecified chronicity, unspecified location  J32.9 473.9   13. Screening for malignant neoplasm of prostate  Z12.5 V76.44    14. Need for influenza vaccination  Z23 V04.81   15. Screening for colorectal cancer  Z12.11 V76.51    Z12.12 V76.41        Plan:     Problem List Items Addressed This Visit          Ophtho    Age-related nuclear cataract, bilateral (Chronic)     -stable and followed by ophthalmology            ENT    Sinusitis     - onset > 2 weeks   -initiate azithromycin pack   -currently utilizing OTC pseudoephedrine, okay to utilize however warned against elevations in blood pressure   -okay to utilize OTC antihistamine  -notify clinic for new or worsening symptoms         Relevant Medications    azithromycin (Z-MELISSA) 250 MG tablet       Cardiac/Vascular    Primary hypertension (Chronic)     -stable   -currently on lisinopril 10 mg, HCTZ 25 mg, valsartan 160 mg...         Mixed hyperlipidemia (Chronic)     -stable   -noted statin myopathy   -currently on Repatha, continue   -low-cholesterol diet            Renal/    Erectile dysfunction (Chronic)     -stable followed by Urology  -currently on Cialis and Viagra, continue         Screening for malignant neoplasm of prostate     -order updated PSA level   -patient also on testosterone replacement         Relevant Orders    PSA, Screening    Renal function test abnormal     eGFR   Date Value Ref Range Status   10/07/2024 44 mL/min/1.73/m2 Final   02/23/2023 70 >59 mL/min/1.73 Final     Lab Results   Component Value Date    CREATININE 1.72 (H) 10/07/2024    BUN 27.3 (H) 10/07/2024     10/07/2024    K 4.7 10/07/2024     10/07/2024    CO2 23 10/07/2024   -order repeat metabolic panel encouraged to obtain well hydrated    -increase fluid hydration   -avoid nephrotoxic drugs (NSAIDs, etc.)   -encouraged tight control of hypertension           Relevant Orders    Comprehensive Metabolic Panel       ID    Need for influenza vaccination     -immunizations reviewed and influenza vaccine noted due  -influenza l infection and immunization discussed. Patient verbalized  "understanding   -order influenza vaccine today           Relevant Medications    influenza (Flulaval, Fluzone, Fluarix) 45 mcg/0.5 mL IM vaccine (> or = 6 mo) 0.5 mL (Completed)       Endocrine    Testicular hypofunction (Chronic)     - followed by Urology   -elevated testosterone level, order updated testosterone labs since reports obtain after recent shot  -currently on testosterone cypionate 200 mg weekly, continue for now and follow repeat lab           Relevant Orders    Comprehensive Metabolic Panel    Testosterone    PSA, Screening    Class 1 obesity due to excess calories with serious comorbidity and body mass index (BMI) of 34.0 to 34.9 in adult (Chronic)     Estimated body mass index is 34.77 kg/m² as calculated from the following:    Height as of this encounter: 5' 7" (1.702 m).    Weight as of this encounter: 100.7 kg (222 lb).   -encourage low-calorie low carb diet   -encourage some form of routine aerobic exercise              GI    GERD (gastroesophageal reflux disease) (Chronic)     -stable   -currently on omeprazole and famotidine, continue  -avoid food triggers (acidic, spicy, greasy, etc.)  -last meal of the day 2-3 hours before bedtime  -avoid large meals in one sitting, encourage small frequent meals           Screening for colorectal cancer     -age-appropriate screenings reviewed and colorectal cancer screening noted due  -colorectal cancer screening screening significance discussed. Patient verbalized understanding   -reports plans to obtain            Orthopedic    Myalgia due to statin (Chronic)     Currently on Repatha  tolerating well, follow            Other    Primary insomnia (Chronic)     -stable   -currently on Restoril, continue         Wellness examination - Primary     -patient feeling generally well today  -wellness labs reviewed in generally stable, elevated testosterone and renal function  -age-appropriate screenings reviewed and discussed  -immunizations reviewed and " discussed  -encourage routine aerobic exercise 2 to 3 times a week  -increase fluid hydration             Relevant Orders    PSA, Screening          Follow up in about 6 months (around 2025) for with Dr. Cadet, General Checkup.   -plan specifics discussed above    Orders Placed This Encounter    Comprehensive Metabolic Panel    Testosterone    PSA, Screening    azithromycin (Z-MELISSA) 250 MG tablet    influenza (Flulaval, Fluzone, Fluarix) 45 mcg/0.5 mL IM vaccine (> or = 6 mo) 0.5 mL        Medication List with Changes/Refills   New Medications    AZITHROMYCIN (Z-MELISSA) 250 MG TABLET    Take 2 tablets by mouth on day 1; Take 1 tablet by mouth on days 2-5   Current Medications    ANASTROZOLE (ARIMIDEX) 1 MG TAB    Take 1 mg by mouth every 7 days.    EVOLOCUMAB (REPATHA SURECLICK) 140 MG/ML PNIJ        FAMOTIDINE (PEPCID) 20 MG TABLET    Take 20 mg by mouth every evening.    HYDROCHLOROTHIAZIDE (HYDRODIURIL) 25 MG TABLET        LISINOPRIL 10 MG TABLET    Take 1 tablet (10 mg total) by mouth once daily.    MELOXICAM (MOBIC) 15 MG TABLET    Take 15 mg by mouth once daily.    OMEPRAZOLE (PRILOSEC) 40 MG CAPSULE    Take 40 mg by mouth 2 (two) times daily.    SILDENAFIL (VIAGRA) 100 MG TABLET    Take 100 mg by mouth.    TADALAFIL (CIALIS) 5 MG TABLET    Take 5 mg by mouth.    TEMAZEPAM (RESTORIL) 30 MG CAPSULE    TAKE 1 CAPSULE BY MOUTH EVERY EVENING    TESTOSTERONE CYPIONATE (DEPOTESTOTERONE CYPIONATE) 200 MG/ML INJECTION    SMARTSI.75 Milliliter(s) IM Once a Week    TIZANIDINE (ZANAFLEX) 4 MG TABLET    Take 4 mg by mouth every 8 (eight) hours as needed.    VALSARTAN (DIOVAN) 160 MG TABLET    Take 160 mg by mouth.

## 2024-10-11 NOTE — ASSESSMENT & PLAN NOTE
- followed by Urology   -elevated testosterone level, order updated testosterone labs since reports obtain after recent shot  -currently on testosterone cypionate 200 mg weekly, continue for now and follow repeat lab

## 2024-10-11 NOTE — ASSESSMENT & PLAN NOTE
"Estimated body mass index is 34.77 kg/m² as calculated from the following:    Height as of this encounter: 5' 7" (1.702 m).    Weight as of this encounter: 100.7 kg (222 lb).   -encourage low-calorie low carb diet   -encourage some form of routine aerobic exercise    "

## 2024-10-11 NOTE — ASSESSMENT & PLAN NOTE
-age-appropriate screenings reviewed and colorectal cancer screening noted due  -colorectal cancer screening screening significance discussed. Patient verbalized understanding   -reports plans to obtain

## 2024-10-29 ENCOUNTER — TELEPHONE (OUTPATIENT)
Dept: INTERNAL MEDICINE | Facility: CLINIC | Age: 64
End: 2024-10-29
Payer: COMMERCIAL

## 2024-10-29 ENCOUNTER — LAB VISIT (OUTPATIENT)
Dept: LAB | Facility: HOSPITAL | Age: 64
End: 2024-10-29
Payer: COMMERCIAL

## 2024-10-29 DIAGNOSIS — E29.1 TESTICULAR HYPOFUNCTION: Chronic | ICD-10-CM

## 2024-10-29 DIAGNOSIS — Z00.00 WELLNESS EXAMINATION: ICD-10-CM

## 2024-10-29 DIAGNOSIS — Z12.5 SCREENING FOR MALIGNANT NEOPLASM OF PROSTATE: ICD-10-CM

## 2024-10-29 DIAGNOSIS — R94.4 RENAL FUNCTION TEST ABNORMAL: ICD-10-CM

## 2024-10-29 LAB
ALBUMIN SERPL-MCNC: 4.1 G/DL (ref 3.4–4.8)
ALBUMIN/GLOB SERPL: 1.1 RATIO (ref 1.1–2)
ALP SERPL-CCNC: 102 UNIT/L (ref 40–150)
ALT SERPL-CCNC: 43 UNIT/L (ref 0–55)
ANION GAP SERPL CALC-SCNC: 8 MEQ/L
AST SERPL-CCNC: 37 UNIT/L (ref 5–34)
BILIRUB SERPL-MCNC: 1 MG/DL
BUN SERPL-MCNC: 20.5 MG/DL (ref 8.4–25.7)
CALCIUM SERPL-MCNC: 9.3 MG/DL (ref 8.8–10)
CHLORIDE SERPL-SCNC: 101 MMOL/L (ref 98–107)
CO2 SERPL-SCNC: 27 MMOL/L (ref 23–31)
CREAT SERPL-MCNC: 1.42 MG/DL (ref 0.72–1.25)
CREAT/UREA NIT SERPL: 14
GFR SERPLBLD CREATININE-BSD FMLA CKD-EPI: 56 ML/MIN/1.73/M2
GLOBULIN SER-MCNC: 3.7 GM/DL (ref 2.4–3.5)
GLUCOSE SERPL-MCNC: 108 MG/DL (ref 82–115)
POTASSIUM SERPL-SCNC: 4.5 MMOL/L (ref 3.5–5.1)
PROT SERPL-MCNC: 7.8 GM/DL (ref 5.8–7.6)
PSA SERPL-MCNC: 1.19 NG/ML
SODIUM SERPL-SCNC: 136 MMOL/L (ref 136–145)
TESTOST SERPL-MCNC: 349.67 NG/DL (ref 220.91–715.81)

## 2024-10-29 PROCEDURE — 80053 COMPREHEN METABOLIC PANEL: CPT

## 2024-10-29 PROCEDURE — 84153 ASSAY OF PSA TOTAL: CPT

## 2024-10-29 PROCEDURE — 36415 COLL VENOUS BLD VENIPUNCTURE: CPT

## 2024-10-29 PROCEDURE — 84403 ASSAY OF TOTAL TESTOSTERONE: CPT

## 2024-10-30 ENCOUNTER — PATIENT MESSAGE (OUTPATIENT)
Dept: INTERNAL MEDICINE | Facility: CLINIC | Age: 64
End: 2024-10-30
Payer: COMMERCIAL

## 2024-11-14 ENCOUNTER — PATIENT MESSAGE (OUTPATIENT)
Dept: INTERNAL MEDICINE | Facility: CLINIC | Age: 64
End: 2024-11-14
Payer: COMMERCIAL

## 2024-11-14 DIAGNOSIS — H93.19 TINNITUS, UNSPECIFIED LATERALITY: Primary | ICD-10-CM

## 2024-11-20 ENCOUNTER — PATIENT MESSAGE (OUTPATIENT)
Dept: ADMINISTRATIVE | Facility: HOSPITAL | Age: 64
End: 2024-11-20
Payer: COMMERCIAL

## 2024-12-08 DIAGNOSIS — Z00.00 ANNUAL PHYSICAL EXAM: Primary | ICD-10-CM

## 2024-12-09 RX ORDER — TEMAZEPAM 30 MG/1
30 CAPSULE ORAL
Qty: 30 CAPSULE | Refills: 2 | Status: SHIPPED | OUTPATIENT
Start: 2024-12-09

## 2024-12-19 ENCOUNTER — DOCUMENTATION ONLY (OUTPATIENT)
Dept: INTERNAL MEDICINE | Facility: CLINIC | Age: 64
End: 2024-12-19
Payer: COMMERCIAL

## 2024-12-19 LAB — CRC RECOMMENDATION EXT: NORMAL

## 2024-12-30 ENCOUNTER — TELEPHONE (OUTPATIENT)
Dept: INTERNAL MEDICINE | Facility: CLINIC | Age: 64
End: 2024-12-30
Payer: COMMERCIAL

## 2024-12-30 NOTE — TELEPHONE ENCOUNTER
----- Message from [employer] sent at 12/30/2024  2:24 PM CST -----  .Type:  Patient Returning Call    Who Called:Nany with Dr Velez office  Who Left Message for Patient:Nany  Does the patient know what this is regarding?:Please call back about sleep study fax    Would the patient rather a call back or a response via [facility]?   Best Call Back Number:[phone]  Additional Information: Please call back about sleep study fax

## 2024-12-31 ENCOUNTER — PATIENT MESSAGE (OUTPATIENT)
Dept: INTERNAL MEDICINE | Facility: CLINIC | Age: 64
End: 2024-12-31
Payer: COMMERCIAL

## 2024-12-31 DIAGNOSIS — G47.30 SLEEP APNEA, UNSPECIFIED TYPE: Primary | ICD-10-CM

## 2025-01-07 ENCOUNTER — OFFICE VISIT (OUTPATIENT)
Dept: NEUROLOGY | Facility: CLINIC | Age: 65
End: 2025-01-07
Payer: COMMERCIAL

## 2025-01-07 VITALS
DIASTOLIC BLOOD PRESSURE: 70 MMHG | BODY MASS INDEX: 34.53 KG/M2 | SYSTOLIC BLOOD PRESSURE: 120 MMHG | WEIGHT: 220 LBS | HEIGHT: 67 IN

## 2025-01-07 DIAGNOSIS — G47.30 SLEEP APNEA, UNSPECIFIED TYPE: ICD-10-CM

## 2025-01-07 DIAGNOSIS — F51.04 CHRONIC INSOMNIA: ICD-10-CM

## 2025-01-07 DIAGNOSIS — G47.33 OBSTRUCTIVE SLEEP APNEA SYNDROME: Primary | ICD-10-CM

## 2025-01-07 PROCEDURE — 3078F DIAST BP <80 MM HG: CPT | Mod: CPTII,S$GLB,, | Performed by: SPECIALIST

## 2025-01-07 PROCEDURE — 99204 OFFICE O/P NEW MOD 45 MIN: CPT | Mod: S$GLB,,, | Performed by: SPECIALIST

## 2025-01-07 PROCEDURE — 1159F MED LIST DOCD IN RCRD: CPT | Mod: CPTII,S$GLB,, | Performed by: SPECIALIST

## 2025-01-07 PROCEDURE — 99999 PR PBB SHADOW E&M-EST. PATIENT-LVL III: CPT | Mod: PBBFAC,,, | Performed by: SPECIALIST

## 2025-01-07 PROCEDURE — 3074F SYST BP LT 130 MM HG: CPT | Mod: CPTII,S$GLB,, | Performed by: SPECIALIST

## 2025-01-07 PROCEDURE — 3008F BODY MASS INDEX DOCD: CPT | Mod: CPTII,S$GLB,, | Performed by: SPECIALIST

## 2025-01-07 NOTE — PROGRESS NOTES
"Subjective:       Patient ID: Antoni Wright is a 64 y.o. male.    Chief Complaint: snoring; sleep apnea evaluation; other:    HPI:            Sleep Apnea (New pt presents to clinic alone ref by Dr. JAZMÍN Cadet. Pt states that he wakes throughout the night, sleeps in 15-20 min intervals. Trouble falling asleep/ staying asleep. When takes sleep aide, sleeps 3-4 hrs. Was told by dr when did colonoscopy"you have the worst sleep apnea' he's ever seen" Pt stops breathing all throughout the night, chokes in sleep, snores. Denies vivid dreams/hallucinations. Has EDS, denies refreshed awakenings.)    After colonoscopy [facility] internist referred here     notes may also be on facesheet for HPI, ROS, and other sections     ROS:    Beer and couple shots whisky nightly         1/7/2025     9:29 AM   EPWORTH SLEEPINESS SCALE   Sitting and reading 2   Watching TV 3   Sitting, inactive in a public place (e.g. a theatre or a meeting) 1   As a passenger in a car for an hour without a break 0   Lying down to rest in the afternoon when circumstances permit 0   Sitting and talking to someone 0   Sitting quietly after a lunch without alcohol 0   In a car, while stopped for a few minutes in traffic 0   Total score 6           -------------------------------------    Age-related nuclear cataract, bilateral    GERD (gastroesophageal reflux disease)    Hypertension    Mixed hyperlipidemia    Myalgia due to statin    Personal history of colonic polyps    Dr Adonay Velez    Primary hypertension    Primary insomnia       Current Outpatient Medications   Medication Instructions    anastrozole (ARIMIDEX) 1 mg, Every 7 days    azithromycin (Z-MELISSA) 250 MG tablet Take 2 tablets by mouth on day 1; Take 1 tablet by mouth on days 2-5    evolocumab (REPATHA SURECLICK) 140 mg/mL PnIj No dose, route, or frequency recorded.    famotidine (PEPCID) 20 mg, Nightly    hydroCHLOROthiazide (HYDRODIURIL) 25 MG tablet No dose, route, or frequency recorded.    " "lisinopriL 10 mg, Oral, Daily    meloxicam (MOBIC) 15 mg, Daily    omeprazole (PRILOSEC) 40 mg, 2 times daily    sildenafiL (VIAGRA) 100 mg    tadalafiL (CIALIS) 5 mg    temazepam (RESTORIL) 30 mg, Oral    testosterone cypionate (DEPOTESTOTERONE CYPIONATE) 200 mg/mL injection SMARTSI.75 Milliliter(s) IM Once a Week    tiZANidine (ZANAFLEX) 4 mg, Every 8 hours PRN    valsartan (DIOVAN) 160 mg     Restoril nightly     Objective:      Exam:   Visit Vitals  /70 (BP Location: Left arm, Patient Position: Sitting)   Ht 5' 7" (1.702 m)   Wt 99.8 kg (220 lb)   BMI 34.46 kg/m²     Neck Circumference: 20.5 in     General:   If Accompanied, by__    heart:    [x]RRR s murmur    abnl[]:     pharynx: m1[]   m2[]   m3[]    m4[x]   Red tissues   Neck:   Extr: no edema     Neurological []nl  []Abnml:     Speech:  [x] []  vis fields:  [] []  EOMs:  [x] []  funduscopic: [] []  Motor:   [] []  coord:   [] []  Gait:   [x] []      Labs:  __  Lengthy discussion about the risks of untreated moderate to severe obstructive sleep apnea (SANDRA).   Testing modalities/test options for sleep apnea discussed.  Potential need for treatment of SANDRA discussed including CPAP or Bilevel  PAP.   Alternatives to PAP discussed if PAP not ultimately tolerated.  Risks of drowsy driving discussed.  Weight loss discussed if patient is overweight.          Assessment/Plan:         ICD-10-CM ICD-9-CM   1. Obstructive sleep apnea syndrome  G47.33 327.23   2. Chronic insomnia  F51.04 780.52       HST ordered, and if PAP needed, patient unwilling to come into the lab for PAP night. Autopap can be ordered.   Orders Placed This Encounter   Procedures    Home Sleep Study        Darrin Payton MD  Director, Sleep center [facility], [facility]   Fellow, American Academy of Sleep Medicine; Fellow. American Academy of Neurology               "

## 2025-01-13 ENCOUNTER — PROCEDURE VISIT (OUTPATIENT)
Dept: SLEEP MEDICINE | Facility: HOSPITAL | Age: 65
End: 2025-01-13
Attending: SPECIALIST
Payer: COMMERCIAL

## 2025-01-13 DIAGNOSIS — G47.33 OBSTRUCTIVE SLEEP APNEA SYNDROME: ICD-10-CM

## 2025-01-13 DIAGNOSIS — F51.04 CHRONIC INSOMNIA: ICD-10-CM

## 2025-01-13 PROCEDURE — 95806 SLEEP STUDY UNATT&RESP EFFT: CPT | Mod: 26,,, | Performed by: SPECIALIST

## 2025-01-13 PROCEDURE — G0399 HOME SLEEP TEST/TYPE 3 PORTA: HCPCS

## 2025-02-24 ENCOUNTER — OFFICE VISIT (OUTPATIENT)
Dept: NEUROLOGY | Facility: CLINIC | Age: 65
End: 2025-02-24
Payer: COMMERCIAL

## 2025-02-24 DIAGNOSIS — G47.33 OBSTRUCTIVE SLEEP APNEA SYNDROME: Primary | ICD-10-CM

## 2025-02-24 PROCEDURE — 1159F MED LIST DOCD IN RCRD: CPT | Mod: CPTII,95,,

## 2025-02-24 PROCEDURE — 1160F RVW MEDS BY RX/DR IN RCRD: CPT | Mod: CPTII,95,,

## 2025-02-24 PROCEDURE — 98004 SYNCH AUDIO-VIDEO EST SF 10: CPT | Mod: 95,,,

## 2025-02-24 NOTE — PROGRESS NOTES
Neurology Clinic - Virtual Visit      Patient ID: [id number]     Subjective:     Chief Complaint: Sleep Apnea (Review HST results)      HPI:    Antoni Wright is a 64 y.o. male here today for a telemedicine visit.     Pt here to review HST results; no changes since last visit.    HST results:   HST JARROD: 15  Minimum SaO2: 82 %  Time SaO2 value is less than 88%: 16 min    This is a telemedicine note. Patient was treated using telemedicine, real time audio and video, according to [facility] protocols. Inez OLMSTEAD FNP-C, conducted the visit from the [facility]. The patient participated in the visit at a non-[facility] location selected by the patient (or patient's representative), identified below. I am licensed in the state where the patient stated they are located. The patient (or patient's representative) stated that they understood and accepted the privacy and security risks to their information at their location. This visit is not recorded.    The patient's location is: home  Visit type: audiovisual   His wife is in attendance on the call    Past Medical History:   Diagnosis Date    Age-related nuclear cataract, bilateral 10/11/2024    GERD (gastroesophageal reflux disease) 10/11/2024    Hypertension     Mixed hyperlipidemia 10/11/2024    Myalgia due to statin 10/11/2024    Personal history of colonic polyps 11/09/2021    Dr Adonay Velez    Primary hypertension 10/11/2024    Primary insomnia 10/11/2024        Past Surgical History:   Procedure Laterality Date    COLONOSCOPY  11/09/2021    Dr Adonay Velez    FACIAL COSMETIC SURGERY      KNEE SURGERY      SINUS SURGERY          Social History     Tobacco Use    Smoking status: Never    Smokeless tobacco: Never   Substance and Sexual Activity    Alcohol use: Yes     Alcohol/week: 14.0 standard drinks of alcohol     Types: 7 Cans of beer, 7 Shots of liquor per week    Drug use: Never    Sexual activity: Yes     Partners: Female        Current  Outpatient Medications   Medication Instructions    anastrozole (ARIMIDEX) 1 mg, Every 7 days    evolocumab (REPATHA SURECLICK) 140 mg/mL PnIj No dose, route, or frequency recorded.    famotidine (PEPCID) 20 mg, Nightly    hydroCHLOROthiazide (HYDRODIURIL) 25 MG tablet No dose, route, or frequency recorded.    omeprazole (PRILOSEC) 40 mg, 2 times daily    sildenafiL (VIAGRA) 100 mg    tadalafiL (CIALIS) 5 mg    temazepam (RESTORIL) 30 mg, Oral    testosterone cypionate (DEPOTESTOTERONE CYPIONATE) 200 mg/mL injection SMARTSI.75 Milliliter(s) IM Once a Week    valsartan (DIOVAN) 160 mg       Review of patient's allergies indicates:  No Known Allergies     Review of Systems    12 point review of systems conducted, negative except as stated in the history of present illness. See HPI for details.    Objective:     There were no vitals taken for this visit.    Physical Exam      Physical Exam: LIMITED DUE TO TELEMEDICINE RESTRICTIONS.  General: Alert and oriented, No acute distress.  Speech: clear/ fluent  Head: Normocephalic.  Eyes: Sclera non-icteric. EOM intact.  Respiratory: Non-labored respirations, pulmonary effort normal  Musculoskeletal: range of motion grossly normal  Integumentary:  No visible suspicious lesions or rashes. No jaundice or diaphoresis.   Neurologic: No focal deficits  Psychiatric: Normal interaction, Coherent speech, Euthymic mood, Appropriate affect     Assessment:       ICD-10-CM ICD-9-CM   1. Obstructive sleep apnea syndrome  G47.33 327.23        Plan:     1. Obstructive sleep apnea syndrome  -     CPAP FOR HOME USE    Reviewed HST report with pt (see detailed results and interpretation scanned into chart).     Suggest autoPAP with pressures from 5-20 cm.    Encouraged nightly use of PAP. Discussed minimum insurance guidelines for PAP use    Reminded pt that drowsy driving may still occur despite PAP use.    Follow up in about 8 weeks (around 2025) for Virtual Visit, SANDRA follow-up. In  addition to their scheduled follow up, the patient has also been instructed to follow up on as needed basis.     Future Appointments   Date Time Provider Department Center   4/14/2025  4:00 PM Dewayne Cadet II, MD [id number]        Video Time Documentation:  Spent 10 minutes with patient face to face discussed health concerns. More than 50% of this time was spent in counseling and coordination of care.    Inez Loo, MSN, APRN, FNP-C  [facility]  [phone]

## 2025-03-06 ENCOUNTER — PATIENT MESSAGE (OUTPATIENT)
Dept: NEUROLOGY | Facility: CLINIC | Age: 65
End: 2025-03-06
Payer: COMMERCIAL

## 2025-03-18 DIAGNOSIS — Z00.00 ANNUAL PHYSICAL EXAM: ICD-10-CM

## 2025-03-19 RX ORDER — TEMAZEPAM 30 MG/1
30 CAPSULE ORAL NIGHTLY
Qty: 30 CAPSULE | Refills: 2 | Status: SHIPPED | OUTPATIENT
Start: 2025-03-19

## 2025-04-04 DIAGNOSIS — I10 PRIMARY HYPERTENSION: Primary | ICD-10-CM

## 2025-04-04 DIAGNOSIS — E66.811 CLASS 1 OBESITY DUE TO EXCESS CALORIES WITH SERIOUS COMORBIDITY AND BODY MASS INDEX (BMI) OF 34.0 TO 34.9 IN ADULT: ICD-10-CM

## 2025-04-04 DIAGNOSIS — E78.2 MIXED HYPERLIPIDEMIA: ICD-10-CM

## 2025-04-04 DIAGNOSIS — E29.1 TESTICULAR HYPOFUNCTION: ICD-10-CM

## 2025-04-04 DIAGNOSIS — E66.09 CLASS 1 OBESITY DUE TO EXCESS CALORIES WITH SERIOUS COMORBIDITY AND BODY MASS INDEX (BMI) OF 34.0 TO 34.9 IN ADULT: ICD-10-CM

## 2025-04-07 ENCOUNTER — TELEPHONE (OUTPATIENT)
Dept: INTERNAL MEDICINE | Facility: CLINIC | Age: 65
End: 2025-04-07
Payer: COMMERCIAL

## 2025-04-07 NOTE — TELEPHONE ENCOUNTER
----- Message from Nurse Ho sent at 4/4/2025  7:54 AM CDT -----  Regarding: surekha kapoor 4/14 @4  Are there any outstanding tasks in patient chart? Needs fasting labsIs there documentation of outstanding tasks in patient chart? noHas patient been to the ER, urgent care, or another physician since last visit?Has patient done any blood work or x-rays since last visit?5. PLEASE HAVE PATIENT BRING MEDICATION LIST OR BOTTLES TO EVERY OFFICE VISIT

## 2025-04-10 ENCOUNTER — LAB VISIT (OUTPATIENT)
Dept: LAB | Facility: HOSPITAL | Age: 65
End: 2025-04-10
Attending: INTERNAL MEDICINE
Payer: COMMERCIAL

## 2025-04-10 DIAGNOSIS — E66.811 CLASS 1 OBESITY DUE TO EXCESS CALORIES WITH SERIOUS COMORBIDITY AND BODY MASS INDEX (BMI) OF 34.0 TO 34.9 IN ADULT: ICD-10-CM

## 2025-04-10 DIAGNOSIS — E66.09 CLASS 1 OBESITY DUE TO EXCESS CALORIES WITH SERIOUS COMORBIDITY AND BODY MASS INDEX (BMI) OF 34.0 TO 34.9 IN ADULT: ICD-10-CM

## 2025-04-10 DIAGNOSIS — E78.2 MIXED HYPERLIPIDEMIA: ICD-10-CM

## 2025-04-10 DIAGNOSIS — E29.1 TESTICULAR HYPOFUNCTION: ICD-10-CM

## 2025-04-10 DIAGNOSIS — I10 PRIMARY HYPERTENSION: ICD-10-CM

## 2025-04-10 LAB
ALBUMIN SERPL-MCNC: 4.1 G/DL (ref 3.4–4.8)
ALBUMIN/GLOB SERPL: 1.1 RATIO (ref 1.1–2)
ALP SERPL-CCNC: 101 UNIT/L (ref 40–150)
ALT SERPL-CCNC: 35 UNIT/L (ref 0–55)
ANION GAP SERPL CALC-SCNC: 10 MEQ/L
AST SERPL-CCNC: 29 UNIT/L (ref 11–45)
BASOPHILS # BLD AUTO: 0.03 X10(3)/MCL
BASOPHILS NFR BLD AUTO: 0.5 %
BILIRUB SERPL-MCNC: 0.8 MG/DL
BUN SERPL-MCNC: 18.9 MG/DL (ref 8.4–25.7)
CALCIUM SERPL-MCNC: 9.6 MG/DL (ref 8.8–10)
CHLORIDE SERPL-SCNC: 102 MMOL/L (ref 98–107)
CHOLEST SERPL-MCNC: 145 MG/DL
CHOLEST/HDLC SERPL: 3 {RATIO} (ref 0–5)
CO2 SERPL-SCNC: 28 MMOL/L (ref 23–31)
CREAT SERPL-MCNC: 1.53 MG/DL (ref 0.72–1.25)
CREAT/UREA NIT SERPL: 12
EOSINOPHIL # BLD AUTO: 0.17 X10(3)/MCL (ref 0–0.9)
EOSINOPHIL NFR BLD AUTO: 2.8 %
ERYTHROCYTE [DISTWIDTH] IN BLOOD BY AUTOMATED COUNT: 14.1 % (ref 11.5–17)
GFR SERPLBLD CREATININE-BSD FMLA CKD-EPI: 51 ML/MIN/1.73/M2
GLOBULIN SER-MCNC: 3.8 GM/DL (ref 2.4–3.5)
GLUCOSE SERPL-MCNC: 106 MG/DL (ref 82–115)
HCT VFR BLD AUTO: 49.7 % (ref 42–52)
HDLC SERPL-MCNC: 42 MG/DL (ref 35–60)
HGB BLD-MCNC: 16.6 G/DL (ref 14–18)
IMM GRANULOCYTES # BLD AUTO: 0.01 X10(3)/MCL (ref 0–0.04)
IMM GRANULOCYTES NFR BLD AUTO: 0.2 %
LDLC SERPL CALC-MCNC: 61 MG/DL (ref 50–140)
LYMPHOCYTES # BLD AUTO: 2.12 X10(3)/MCL (ref 0.6–4.6)
LYMPHOCYTES NFR BLD AUTO: 35 %
MCH RBC QN AUTO: 30.2 PG (ref 27–31)
MCHC RBC AUTO-ENTMCNC: 33.4 G/DL (ref 33–36)
MCV RBC AUTO: 90.4 FL (ref 80–94)
MONOCYTES # BLD AUTO: 1.02 X10(3)/MCL (ref 0.1–1.3)
MONOCYTES NFR BLD AUTO: 16.8 %
NEUTROPHILS # BLD AUTO: 2.71 X10(3)/MCL (ref 2.1–9.2)
NEUTROPHILS NFR BLD AUTO: 44.7 %
NRBC BLD AUTO-RTO: 0 %
PLATELET # BLD AUTO: 255 X10(3)/MCL (ref 130–400)
PMV BLD AUTO: 9.7 FL (ref 7.4–10.4)
POTASSIUM SERPL-SCNC: 4.8 MMOL/L (ref 3.5–5.1)
PROT SERPL-MCNC: 7.9 GM/DL (ref 5.8–7.6)
RBC # BLD AUTO: 5.5 X10(6)/MCL (ref 4.7–6.1)
SODIUM SERPL-SCNC: 140 MMOL/L (ref 136–145)
TESTOST SERPL-MCNC: 582 NG/DL (ref 220.91–715.81)
TRIGL SERPL-MCNC: 211 MG/DL (ref 34–140)
TSH SERPL-ACNC: 3.32 UIU/ML (ref 0.35–4.94)
VLDLC SERPL CALC-MCNC: 42 MG/DL
WBC # BLD AUTO: 6.06 X10(3)/MCL (ref 4.5–11.5)

## 2025-04-10 PROCEDURE — 85025 COMPLETE CBC W/AUTO DIFF WBC: CPT

## 2025-04-10 PROCEDURE — 80061 LIPID PANEL: CPT

## 2025-04-10 PROCEDURE — 84403 ASSAY OF TOTAL TESTOSTERONE: CPT

## 2025-04-10 PROCEDURE — 36415 COLL VENOUS BLD VENIPUNCTURE: CPT

## 2025-04-10 PROCEDURE — 84443 ASSAY THYROID STIM HORMONE: CPT

## 2025-04-10 PROCEDURE — 80053 COMPREHEN METABOLIC PANEL: CPT

## 2025-04-14 ENCOUNTER — OFFICE VISIT (OUTPATIENT)
Dept: INTERNAL MEDICINE | Facility: CLINIC | Age: 65
End: 2025-04-14
Payer: COMMERCIAL

## 2025-04-14 VITALS
OXYGEN SATURATION: 95 % | HEART RATE: 93 BPM | HEIGHT: 67 IN | WEIGHT: 220 LBS | RESPIRATION RATE: 16 BRPM | BODY MASS INDEX: 34.53 KG/M2 | SYSTOLIC BLOOD PRESSURE: 130 MMHG | DIASTOLIC BLOOD PRESSURE: 72 MMHG

## 2025-04-14 DIAGNOSIS — T46.6X5A STATIN MYOPATHY: ICD-10-CM

## 2025-04-14 DIAGNOSIS — I10 PRIMARY HYPERTENSION: Primary | Chronic | ICD-10-CM

## 2025-04-14 DIAGNOSIS — Z00.00 WELLNESS EXAMINATION: ICD-10-CM

## 2025-04-14 DIAGNOSIS — M79.10 MYALGIA DUE TO STATIN: Chronic | ICD-10-CM

## 2025-04-14 DIAGNOSIS — E78.2 MIXED HYPERLIPIDEMIA: Chronic | ICD-10-CM

## 2025-04-14 DIAGNOSIS — G72.0 STATIN MYOPATHY: ICD-10-CM

## 2025-04-14 DIAGNOSIS — T46.6X5A MYALGIA DUE TO STATIN: Chronic | ICD-10-CM

## 2025-06-19 DIAGNOSIS — Z00.00 ANNUAL PHYSICAL EXAM: ICD-10-CM

## 2025-06-19 RX ORDER — TEMAZEPAM 30 MG/1
30 CAPSULE ORAL
Qty: 15 CAPSULE | Refills: 0 | Status: SHIPPED | OUTPATIENT
Start: 2025-06-19

## 2025-07-05 DIAGNOSIS — Z00.00 ANNUAL PHYSICAL EXAM: ICD-10-CM

## 2025-07-07 RX ORDER — TEMAZEPAM 30 MG/1
30 CAPSULE ORAL NIGHTLY
Qty: 15 CAPSULE | Refills: 0 | Status: SHIPPED | OUTPATIENT
Start: 2025-07-07

## 2025-07-15 ENCOUNTER — PATIENT MESSAGE (OUTPATIENT)
Dept: INTERNAL MEDICINE | Facility: CLINIC | Age: 65
End: 2025-07-15
Payer: COMMERCIAL

## 2025-07-15 DIAGNOSIS — Z00.00 ANNUAL PHYSICAL EXAM: ICD-10-CM

## 2025-07-16 RX ORDER — TEMAZEPAM 30 MG/1
30 CAPSULE ORAL NIGHTLY
Qty: 30 CAPSULE | Refills: 0 | Status: SHIPPED | OUTPATIENT
Start: 2025-07-16

## 2025-08-19 DIAGNOSIS — Z00.00 ANNUAL PHYSICAL EXAM: ICD-10-CM

## 2025-08-20 RX ORDER — TEMAZEPAM 30 MG/1
30 CAPSULE ORAL NIGHTLY
Qty: 30 CAPSULE | Refills: 0 | Status: SHIPPED | OUTPATIENT
Start: 2025-08-20